# Patient Record
Sex: FEMALE | Race: WHITE | NOT HISPANIC OR LATINO | Employment: OTHER | URBAN - METROPOLITAN AREA
[De-identification: names, ages, dates, MRNs, and addresses within clinical notes are randomized per-mention and may not be internally consistent; named-entity substitution may affect disease eponyms.]

---

## 2022-07-27 ENCOUNTER — TRANSCRIBE ORDERS (OUTPATIENT)
Dept: ADMINISTRATIVE | Facility: HOSPITAL | Age: 67
End: 2022-07-27

## 2022-07-27 ENCOUNTER — HOSPITAL ENCOUNTER (OUTPATIENT)
Dept: MRI IMAGING | Facility: HOSPITAL | Age: 67
Discharge: HOME | End: 2022-07-27

## 2022-07-27 ENCOUNTER — HOSPITAL ENCOUNTER (INPATIENT)
Facility: HOSPITAL | Age: 67
LOS: 3 days | Discharge: HOME | End: 2022-07-30
Attending: INTERNAL MEDICINE | Admitting: INTERNAL MEDICINE

## 2022-07-27 DIAGNOSIS — H05.019 CELLULITIS OF ORBIT, UNSPECIFIED LATERALITY: Primary | ICD-10-CM

## 2022-07-27 DIAGNOSIS — H05.019 CELLULITIS OF ORBIT, UNSPECIFIED LATERALITY: ICD-10-CM

## 2022-07-27 PROBLEM — Q90.9 DOWN SYNDROME: Chronic | Status: ACTIVE | Noted: 2022-07-27

## 2022-07-27 PROBLEM — E03.9 HYPOTHYROID: Chronic | Status: ACTIVE | Noted: 2022-07-27

## 2022-07-27 PROBLEM — R79.89 ELEVATED LFTS: Status: ACTIVE | Noted: 2022-07-27

## 2022-07-27 PROBLEM — R41.89 COGNITIVE IMPAIRMENT: Chronic | Status: ACTIVE | Noted: 2022-07-27

## 2022-07-27 PROBLEM — H05.012 ORBITAL CELLULITIS ON LEFT: Status: ACTIVE | Noted: 2022-07-27

## 2022-07-27 LAB
ALBUMIN SERPL-MCNC: 3.6 G/DL (ref 3.5–5.2)
ALBUMIN/GLOB SERPL: 1.1 G/DL
ALP SERPL-CCNC: 479 U/L (ref 39–117)
ALT SERPL W P-5'-P-CCNC: 96 U/L (ref 1–33)
ANION GAP SERPL CALCULATED.3IONS-SCNC: 10 MMOL/L (ref 5–15)
APTT PPP: 28.2 SECONDS (ref 22.7–35.4)
AST SERPL-CCNC: 48 U/L (ref 1–32)
BASOPHILS # BLD AUTO: 0.06 10*3/MM3 (ref 0–0.2)
BASOPHILS NFR BLD AUTO: 0.8 % (ref 0–1.5)
BILIRUB SERPL-MCNC: 0.2 MG/DL (ref 0–1.2)
BUN SERPL-MCNC: 12 MG/DL (ref 8–23)
BUN/CREAT SERPL: 19.4 (ref 7–25)
CALCIUM SPEC-SCNC: 9.3 MG/DL (ref 8.6–10.5)
CHLORIDE SERPL-SCNC: 100 MMOL/L (ref 98–107)
CK SERPL-CCNC: 62 U/L (ref 20–180)
CO2 SERPL-SCNC: 31 MMOL/L (ref 22–29)
CREAT SERPL-MCNC: 0.62 MG/DL (ref 0.57–1)
CRP SERPL-MCNC: 7.64 MG/DL (ref 0–0.5)
D-LACTATE SERPL-SCNC: 0.7 MMOL/L (ref 0.5–2)
DEPRECATED RDW RBC AUTO: 47.2 FL (ref 37–54)
EGFRCR SERPLBLD CKD-EPI 2021: 99.6 ML/MIN/1.73
EOSINOPHIL # BLD AUTO: 0.1 10*3/MM3 (ref 0–0.4)
EOSINOPHIL NFR BLD AUTO: 1.4 % (ref 0.3–6.2)
ERYTHROCYTE [DISTWIDTH] IN BLOOD BY AUTOMATED COUNT: 13.8 % (ref 12.3–15.4)
ERYTHROCYTE [SEDIMENTATION RATE] IN BLOOD: 90 MM/HR (ref 0–30)
GLOBULIN UR ELPH-MCNC: 3.3 GM/DL
GLUCOSE BLDC GLUCOMTR-MCNC: 84 MG/DL (ref 70–130)
GLUCOSE SERPL-MCNC: 57 MG/DL (ref 65–99)
HBA1C MFR BLD: 5.7 % (ref 4.8–5.6)
HCT VFR BLD AUTO: 42 % (ref 34–46.6)
HGB BLD-MCNC: 14.4 G/DL (ref 12–15.9)
IMM GRANULOCYTES # BLD AUTO: 0.13 10*3/MM3 (ref 0–0.05)
IMM GRANULOCYTES NFR BLD AUTO: 1.8 % (ref 0–0.5)
INR PPP: 1.03 (ref 0.9–1.1)
LYMPHOCYTES # BLD AUTO: 1.5 10*3/MM3 (ref 0.7–3.1)
LYMPHOCYTES NFR BLD AUTO: 20.8 % (ref 19.6–45.3)
MAGNESIUM SERPL-MCNC: 2.6 MG/DL (ref 1.6–2.4)
MCH RBC QN AUTO: 32.3 PG (ref 26.6–33)
MCHC RBC AUTO-ENTMCNC: 34.3 G/DL (ref 31.5–35.7)
MCV RBC AUTO: 94.2 FL (ref 79–97)
MONOCYTES # BLD AUTO: 0.79 10*3/MM3 (ref 0.1–0.9)
MONOCYTES NFR BLD AUTO: 10.9 % (ref 5–12)
NEUTROPHILS NFR BLD AUTO: 4.64 10*3/MM3 (ref 1.7–7)
NEUTROPHILS NFR BLD AUTO: 64.3 % (ref 42.7–76)
NRBC BLD AUTO-RTO: 0 /100 WBC (ref 0–0.2)
PLATELET # BLD AUTO: 368 10*3/MM3 (ref 140–450)
PMV BLD AUTO: 9.6 FL (ref 6–12)
POTASSIUM SERPL-SCNC: 4.4 MMOL/L (ref 3.5–5.2)
PROCALCITONIN SERPL-MCNC: 0.05 NG/ML (ref 0–0.25)
PROT SERPL-MCNC: 6.9 G/DL (ref 6–8.5)
PROTHROMBIN TIME: 13.4 SECONDS (ref 11.7–14.2)
RBC # BLD AUTO: 4.46 10*6/MM3 (ref 3.77–5.28)
SARS-COV-2 RNA RESP QL NAA+PROBE: NOT DETECTED
SODIUM SERPL-SCNC: 141 MMOL/L (ref 136–145)
TSH SERPL DL<=0.05 MIU/L-ACNC: 7.5 UIU/ML (ref 0.27–4.2)
WBC NRBC COR # BLD: 7.22 10*3/MM3 (ref 3.4–10.8)

## 2022-07-27 PROCEDURE — 84443 ASSAY THYROID STIM HORMONE: CPT | Performed by: INTERNAL MEDICINE

## 2022-07-27 PROCEDURE — U0003 INFECTIOUS AGENT DETECTION BY NUCLEIC ACID (DNA OR RNA); SEVERE ACUTE RESPIRATORY SYNDROME CORONAVIRUS 2 (SARS-COV-2) (CORONAVIRUS DISEASE [COVID-19]), AMPLIFIED PROBE TECHNIQUE, MAKING USE OF HIGH THROUGHPUT TECHNOLOGIES AS DESCRIBED BY CMS-2020-01-R: HCPCS | Performed by: INTERNAL MEDICINE

## 2022-07-27 PROCEDURE — 82962 GLUCOSE BLOOD TEST: CPT

## 2022-07-27 PROCEDURE — 25010000002 VANCOMYCIN 10 G RECONSTITUTED SOLUTION: Performed by: INTERNAL MEDICINE

## 2022-07-27 PROCEDURE — 25010000002 PIPERACILLIN SOD-TAZOBACTAM PER 1 G: Performed by: INTERNAL MEDICINE

## 2022-07-27 PROCEDURE — 84145 PROCALCITONIN (PCT): CPT | Performed by: INTERNAL MEDICINE

## 2022-07-27 PROCEDURE — 70543 MRI ORBT/FAC/NCK W/O &W/DYE: CPT

## 2022-07-27 PROCEDURE — 86140 C-REACTIVE PROTEIN: CPT | Performed by: INTERNAL MEDICINE

## 2022-07-27 PROCEDURE — 80053 COMPREHEN METABOLIC PANEL: CPT | Performed by: INTERNAL MEDICINE

## 2022-07-27 PROCEDURE — 85730 THROMBOPLASTIN TIME PARTIAL: CPT | Performed by: INTERNAL MEDICINE

## 2022-07-27 PROCEDURE — 82565 ASSAY OF CREATININE: CPT

## 2022-07-27 PROCEDURE — 83735 ASSAY OF MAGNESIUM: CPT | Performed by: INTERNAL MEDICINE

## 2022-07-27 PROCEDURE — 85025 COMPLETE CBC W/AUTO DIFF WBC: CPT | Performed by: INTERNAL MEDICINE

## 2022-07-27 PROCEDURE — 85652 RBC SED RATE AUTOMATED: CPT | Performed by: INTERNAL MEDICINE

## 2022-07-27 PROCEDURE — 83605 ASSAY OF LACTIC ACID: CPT | Performed by: INTERNAL MEDICINE

## 2022-07-27 PROCEDURE — 83036 HEMOGLOBIN GLYCOSYLATED A1C: CPT | Performed by: INTERNAL MEDICINE

## 2022-07-27 PROCEDURE — 85610 PROTHROMBIN TIME: CPT | Performed by: INTERNAL MEDICINE

## 2022-07-27 PROCEDURE — A9577 INJ MULTIHANCE: HCPCS | Performed by: OPHTHALMOLOGY

## 2022-07-27 PROCEDURE — 82550 ASSAY OF CK (CPK): CPT | Performed by: INTERNAL MEDICINE

## 2022-07-27 PROCEDURE — 0 GADOBENATE DIMEGLUMINE 529 MG/ML SOLUTION: Performed by: OPHTHALMOLOGY

## 2022-07-27 RX ORDER — KETOROLAC TROMETHAMINE 30 MG/ML
30 INJECTION, SOLUTION INTRAMUSCULAR; INTRAVENOUS EVERY 6 HOURS PRN
Status: DISCONTINUED | OUTPATIENT
Start: 2022-07-27 | End: 2022-07-30 | Stop reason: HOSPADM

## 2022-07-27 RX ORDER — ACETAMINOPHEN 160 MG/5ML
650 SOLUTION ORAL EVERY 4 HOURS PRN
Status: DISCONTINUED | OUTPATIENT
Start: 2022-07-27 | End: 2022-07-30 | Stop reason: HOSPADM

## 2022-07-27 RX ORDER — ONDANSETRON 2 MG/ML
4 INJECTION INTRAMUSCULAR; INTRAVENOUS EVERY 6 HOURS PRN
Status: DISCONTINUED | OUTPATIENT
Start: 2022-07-27 | End: 2022-07-30 | Stop reason: HOSPADM

## 2022-07-27 RX ORDER — BISACODYL 5 MG/1
5 TABLET, DELAYED RELEASE ORAL DAILY PRN
Status: DISCONTINUED | OUTPATIENT
Start: 2022-07-27 | End: 2022-07-30 | Stop reason: HOSPADM

## 2022-07-27 RX ORDER — ACETAMINOPHEN 325 MG/1
650 TABLET ORAL EVERY 4 HOURS PRN
Status: DISCONTINUED | OUTPATIENT
Start: 2022-07-27 | End: 2022-07-30 | Stop reason: HOSPADM

## 2022-07-27 RX ORDER — PANTOPRAZOLE SODIUM 40 MG/1
40 TABLET, DELAYED RELEASE ORAL DAILY
COMMUNITY

## 2022-07-27 RX ORDER — UREA 10 %
1 LOTION (ML) TOPICAL NIGHTLY PRN
Status: DISCONTINUED | OUTPATIENT
Start: 2022-07-27 | End: 2022-07-30 | Stop reason: HOSPADM

## 2022-07-27 RX ORDER — LEVOTHYROXINE SODIUM 0.07 MG/1
75 TABLET ORAL
COMMUNITY
End: 2022-07-30 | Stop reason: SDUPTHER

## 2022-07-27 RX ORDER — LEVOTHYROXINE SODIUM 88 UG/1
88 TABLET ORAL
Status: DISCONTINUED | OUTPATIENT
Start: 2022-07-28 | End: 2022-07-30 | Stop reason: HOSPADM

## 2022-07-27 RX ORDER — POLYETHYLENE GLYCOL 3350 17 G/17G
17 POWDER, FOR SOLUTION ORAL DAILY PRN
Status: DISCONTINUED | OUTPATIENT
Start: 2022-07-27 | End: 2022-07-30 | Stop reason: HOSPADM

## 2022-07-27 RX ORDER — CALCIUM CARBONATE 200(500)MG
2 TABLET,CHEWABLE ORAL 2 TIMES DAILY PRN
Status: DISCONTINUED | OUTPATIENT
Start: 2022-07-27 | End: 2022-07-30 | Stop reason: HOSPADM

## 2022-07-27 RX ORDER — SODIUM CHLORIDE 0.9 % (FLUSH) 0.9 %
10 SYRINGE (ML) INJECTION EVERY 12 HOURS SCHEDULED
Status: DISCONTINUED | OUTPATIENT
Start: 2022-07-27 | End: 2022-07-30 | Stop reason: HOSPADM

## 2022-07-27 RX ORDER — ATORVASTATIN CALCIUM 20 MG/1
20 TABLET, FILM COATED ORAL NIGHTLY
Status: DISCONTINUED | OUTPATIENT
Start: 2022-07-27 | End: 2022-07-30 | Stop reason: HOSPADM

## 2022-07-27 RX ORDER — ACETAMINOPHEN 650 MG/1
650 SUPPOSITORY RECTAL EVERY 4 HOURS PRN
Status: DISCONTINUED | OUTPATIENT
Start: 2022-07-27 | End: 2022-07-30 | Stop reason: HOSPADM

## 2022-07-27 RX ORDER — ALENDRONATE SODIUM 70 MG/1
70 TABLET ORAL
COMMUNITY

## 2022-07-27 RX ORDER — GLIMEPIRIDE 2 MG/1
1 TABLET ORAL EVERY 12 HOURS SCHEDULED
Status: DISCONTINUED | OUTPATIENT
Start: 2022-07-27 | End: 2022-07-30 | Stop reason: HOSPADM

## 2022-07-27 RX ORDER — SIMVASTATIN 40 MG
40 TABLET ORAL NIGHTLY
COMMUNITY

## 2022-07-27 RX ORDER — ONDANSETRON 4 MG/1
4 TABLET, FILM COATED ORAL EVERY 6 HOURS PRN
Status: DISCONTINUED | OUTPATIENT
Start: 2022-07-27 | End: 2022-07-30 | Stop reason: HOSPADM

## 2022-07-27 RX ORDER — BISACODYL 10 MG
10 SUPPOSITORY, RECTAL RECTAL DAILY PRN
Status: DISCONTINUED | OUTPATIENT
Start: 2022-07-27 | End: 2022-07-30 | Stop reason: HOSPADM

## 2022-07-27 RX ORDER — AMOXICILLIN 250 MG
2 CAPSULE ORAL 2 TIMES DAILY
Status: DISCONTINUED | OUTPATIENT
Start: 2022-07-27 | End: 2022-07-30 | Stop reason: HOSPADM

## 2022-07-27 RX ORDER — ASCORBIC ACID 500 MG
500 TABLET ORAL DAILY
COMMUNITY

## 2022-07-27 RX ORDER — PANTOPRAZOLE SODIUM 40 MG/1
40 TABLET, DELAYED RELEASE ORAL
Status: DISCONTINUED | OUTPATIENT
Start: 2022-07-28 | End: 2022-07-30 | Stop reason: HOSPADM

## 2022-07-27 RX ORDER — SODIUM CHLORIDE 9 MG/ML
125 INJECTION, SOLUTION INTRAVENOUS CONTINUOUS
Status: DISCONTINUED | OUTPATIENT
Start: 2022-07-27 | End: 2022-07-30

## 2022-07-27 RX ADMIN — Medication 10 ML: at 21:26

## 2022-07-27 RX ADMIN — VANCOMYCIN HYDROCHLORIDE 1250 MG: 10 INJECTION, POWDER, LYOPHILIZED, FOR SOLUTION INTRAVENOUS at 21:25

## 2022-07-27 RX ADMIN — TIMOLOL MALEATE 1 DROP: 2.5 SOLUTION OPHTHALMIC at 23:26

## 2022-07-27 RX ADMIN — SODIUM CHLORIDE 125 ML/HR: 9 INJECTION, SOLUTION INTRAVENOUS at 18:35

## 2022-07-27 RX ADMIN — ATORVASTATIN CALCIUM 20 MG: 20 TABLET, FILM COATED ORAL at 23:26

## 2022-07-27 RX ADMIN — GADOBENATE DIMEGLUMINE 15 ML: 529 INJECTION, SOLUTION INTRAVENOUS at 12:37

## 2022-07-27 RX ADMIN — TAZOBACTAM SODIUM AND PIPERACILLIN SODIUM 3.38 G: 375; 3 INJECTION, SOLUTION INTRAVENOUS at 19:24

## 2022-07-27 NOTE — NURSING NOTE
Dr. Workman read MRI and spoke to Dr. Esau Womack and he is going to admit the patient. Bed board notified and will let us know when a room is assigned. Sister is here with the patient.

## 2022-07-28 ENCOUNTER — APPOINTMENT (OUTPATIENT)
Dept: ULTRASOUND IMAGING | Facility: HOSPITAL | Age: 67
End: 2022-07-28

## 2022-07-28 LAB
ALBUMIN SERPL-MCNC: 2.9 G/DL (ref 3.5–5.2)
ALBUMIN/GLOB SERPL: 1 G/DL
ALP SERPL-CCNC: 397 U/L (ref 39–117)
ALT SERPL W P-5'-P-CCNC: 74 U/L (ref 1–33)
ANION GAP SERPL CALCULATED.3IONS-SCNC: 10.1 MMOL/L (ref 5–15)
AST SERPL-CCNC: 33 U/L (ref 1–32)
BASOPHILS # BLD AUTO: 0.06 10*3/MM3 (ref 0–0.2)
BASOPHILS NFR BLD AUTO: 1 % (ref 0–1.5)
BILIRUB SERPL-MCNC: 0.3 MG/DL (ref 0–1.2)
BUN SERPL-MCNC: 11 MG/DL (ref 8–23)
BUN/CREAT SERPL: 15.5 (ref 7–25)
CALCIUM SPEC-SCNC: 8.2 MG/DL (ref 8.6–10.5)
CHLORIDE SERPL-SCNC: 104 MMOL/L (ref 98–107)
CO2 SERPL-SCNC: 26.9 MMOL/L (ref 22–29)
CREAT BLDA-MCNC: 0.8 MG/DL (ref 0.6–1.3)
CREAT SERPL-MCNC: 0.71 MG/DL (ref 0.57–1)
DEPRECATED RDW RBC AUTO: 48.6 FL (ref 37–54)
EGFRCR SERPLBLD CKD-EPI 2021: 95.1 ML/MIN/1.73
EOSINOPHIL # BLD AUTO: 0.1 10*3/MM3 (ref 0–0.4)
EOSINOPHIL NFR BLD AUTO: 1.6 % (ref 0.3–6.2)
ERYTHROCYTE [DISTWIDTH] IN BLOOD BY AUTOMATED COUNT: 14 % (ref 12.3–15.4)
GLOBULIN UR ELPH-MCNC: 2.9 GM/DL
GLUCOSE SERPL-MCNC: 109 MG/DL (ref 65–99)
HCT VFR BLD AUTO: 40.6 % (ref 34–46.6)
HGB BLD-MCNC: 14 G/DL (ref 12–15.9)
IMM GRANULOCYTES # BLD AUTO: 0.09 10*3/MM3 (ref 0–0.05)
IMM GRANULOCYTES NFR BLD AUTO: 1.5 % (ref 0–0.5)
LYMPHOCYTES # BLD AUTO: 1.12 10*3/MM3 (ref 0.7–3.1)
LYMPHOCYTES NFR BLD AUTO: 18.2 % (ref 19.6–45.3)
MCH RBC QN AUTO: 33.3 PG (ref 26.6–33)
MCHC RBC AUTO-ENTMCNC: 34.5 G/DL (ref 31.5–35.7)
MCV RBC AUTO: 96.7 FL (ref 79–97)
MONOCYTES # BLD AUTO: 0.6 10*3/MM3 (ref 0.1–0.9)
MONOCYTES NFR BLD AUTO: 9.8 % (ref 5–12)
NEUTROPHILS NFR BLD AUTO: 4.17 10*3/MM3 (ref 1.7–7)
NEUTROPHILS NFR BLD AUTO: 67.9 % (ref 42.7–76)
NRBC BLD AUTO-RTO: 0 /100 WBC (ref 0–0.2)
PLATELET # BLD AUTO: 301 10*3/MM3 (ref 140–450)
PMV BLD AUTO: 9.5 FL (ref 6–12)
POTASSIUM SERPL-SCNC: 4 MMOL/L (ref 3.5–5.2)
PROT SERPL-MCNC: 5.8 G/DL (ref 6–8.5)
RBC # BLD AUTO: 4.2 10*6/MM3 (ref 3.77–5.28)
SODIUM SERPL-SCNC: 141 MMOL/L (ref 136–145)
T3FREE SERPL-MCNC: 1.56 PG/ML (ref 2–4.4)
T4 FREE SERPL-MCNC: 1.04 NG/DL (ref 0.93–1.7)
WBC NRBC COR # BLD: 6.14 10*3/MM3 (ref 3.4–10.8)

## 2022-07-28 PROCEDURE — 85025 COMPLETE CBC W/AUTO DIFF WBC: CPT | Performed by: INTERNAL MEDICINE

## 2022-07-28 PROCEDURE — 76705 ECHO EXAM OF ABDOMEN: CPT

## 2022-07-28 PROCEDURE — 84481 FREE ASSAY (FT-3): CPT | Performed by: STUDENT IN AN ORGANIZED HEALTH CARE EDUCATION/TRAINING PROGRAM

## 2022-07-28 PROCEDURE — 25010000002 PIPERACILLIN SOD-TAZOBACTAM PER 1 G: Performed by: INTERNAL MEDICINE

## 2022-07-28 PROCEDURE — 80053 COMPREHEN METABOLIC PANEL: CPT | Performed by: INTERNAL MEDICINE

## 2022-07-28 PROCEDURE — 25010000002 METHYLPREDNISOLONE PER 125 MG: Performed by: OPHTHALMOLOGY

## 2022-07-28 PROCEDURE — 25010000002 VANCOMYCIN 750 MG RECONSTITUTED SOLUTION: Performed by: INTERNAL MEDICINE

## 2022-07-28 PROCEDURE — 25010000002 CEFTRIAXONE PER 250 MG: Performed by: INTERNAL MEDICINE

## 2022-07-28 PROCEDURE — 99223 1ST HOSP IP/OBS HIGH 75: CPT | Performed by: INTERNAL MEDICINE

## 2022-07-28 PROCEDURE — 84439 ASSAY OF FREE THYROXINE: CPT | Performed by: STUDENT IN AN ORGANIZED HEALTH CARE EDUCATION/TRAINING PROGRAM

## 2022-07-28 RX ORDER — METHYLPREDNISOLONE SODIUM SUCCINATE 125 MG/2ML
60 INJECTION, POWDER, LYOPHILIZED, FOR SOLUTION INTRAMUSCULAR; INTRAVENOUS DAILY
Status: DISCONTINUED | OUTPATIENT
Start: 2022-07-28 | End: 2022-07-30

## 2022-07-28 RX ORDER — ERYTHROMYCIN 5 MG/G
OINTMENT OPHTHALMIC EVERY 12 HOURS
Status: DISCONTINUED | OUTPATIENT
Start: 2022-07-28 | End: 2022-07-30 | Stop reason: HOSPADM

## 2022-07-28 RX ADMIN — SODIUM CHLORIDE 750 MG: 900 INJECTION, SOLUTION INTRAVENOUS at 10:58

## 2022-07-28 RX ADMIN — ERYTHROMYCIN: 5 OINTMENT OPHTHALMIC at 22:58

## 2022-07-28 RX ADMIN — SODIUM CHLORIDE 125 ML/HR: 9 INJECTION, SOLUTION INTRAVENOUS at 14:03

## 2022-07-28 RX ADMIN — CEFTRIAXONE 2 G: 2 INJECTION, POWDER, FOR SOLUTION INTRAMUSCULAR; INTRAVENOUS at 22:57

## 2022-07-28 RX ADMIN — PANTOPRAZOLE SODIUM 40 MG: 40 TABLET, DELAYED RELEASE ORAL at 07:02

## 2022-07-28 RX ADMIN — LEVOTHYROXINE SODIUM 88 MCG: 0.09 TABLET ORAL at 07:02

## 2022-07-28 RX ADMIN — TIMOLOL MALEATE 1 DROP: 2.5 SOLUTION OPHTHALMIC at 10:59

## 2022-07-28 RX ADMIN — Medication 10 ML: at 22:58

## 2022-07-28 RX ADMIN — ATORVASTATIN CALCIUM 20 MG: 20 TABLET, FILM COATED ORAL at 22:56

## 2022-07-28 RX ADMIN — DOCUSATE SODIUM 50MG AND SENNOSIDES 8.6MG 2 TABLET: 8.6; 5 TABLET, FILM COATED ORAL at 10:58

## 2022-07-28 RX ADMIN — TAZOBACTAM SODIUM AND PIPERACILLIN SODIUM 3.38 G: 375; 3 INJECTION, SOLUTION INTRAVENOUS at 14:13

## 2022-07-28 RX ADMIN — TAZOBACTAM SODIUM AND PIPERACILLIN SODIUM 3.38 G: 375; 3 INJECTION, SOLUTION INTRAVENOUS at 03:08

## 2022-07-28 RX ADMIN — TIMOLOL MALEATE 1 DROP: 2.5 SOLUTION OPHTHALMIC at 22:58

## 2022-07-28 RX ADMIN — METHYLPREDNISOLONE SODIUM SUCCINATE 60 MG: 125 INJECTION, POWDER, FOR SOLUTION INTRAMUSCULAR; INTRAVENOUS at 18:01

## 2022-07-29 LAB
ALBUMIN SERPL-MCNC: 2.8 G/DL (ref 3.5–5.2)
ALBUMIN SERPL-MCNC: 3 G/DL (ref 3.5–5.2)
ALBUMIN/GLOB SERPL: 0.9 G/DL
ALP SERPL-CCNC: 360 U/L (ref 39–117)
ALP SERPL-CCNC: 364 U/L (ref 39–117)
ALT SERPL W P-5'-P-CCNC: 66 U/L (ref 1–33)
ALT SERPL W P-5'-P-CCNC: 66 U/L (ref 1–33)
ANION GAP SERPL CALCULATED.3IONS-SCNC: 10.2 MMOL/L (ref 5–15)
AST SERPL-CCNC: 29 U/L (ref 1–32)
AST SERPL-CCNC: 36 U/L (ref 1–32)
BASOPHILS # BLD AUTO: 0.02 10*3/MM3 (ref 0–0.2)
BASOPHILS NFR BLD AUTO: 0.2 % (ref 0–1.5)
BILIRUB CONJ SERPL-MCNC: <0.2 MG/DL (ref 0–0.3)
BILIRUB CONJ SERPL-MCNC: <0.2 MG/DL (ref 0–0.3)
BILIRUB INDIRECT SERPL-MCNC: ABNORMAL MG/DL
BILIRUB SERPL-MCNC: <0.2 MG/DL (ref 0–1.2)
BILIRUB SERPL-MCNC: <0.2 MG/DL (ref 0–1.2)
BUN SERPL-MCNC: 11 MG/DL (ref 8–23)
BUN/CREAT SERPL: 19.3 (ref 7–25)
CALCIUM SPEC-SCNC: 8.2 MG/DL (ref 8.6–10.5)
CHLORIDE SERPL-SCNC: 108 MMOL/L (ref 98–107)
CO2 SERPL-SCNC: 23.8 MMOL/L (ref 22–29)
CREAT SERPL-MCNC: 0.57 MG/DL (ref 0.57–1)
DEPRECATED RDW RBC AUTO: 48.3 FL (ref 37–54)
EGFRCR SERPLBLD CKD-EPI 2021: 101.6 ML/MIN/1.73
EOSINOPHIL # BLD AUTO: 0 10*3/MM3 (ref 0–0.4)
EOSINOPHIL NFR BLD AUTO: 0 % (ref 0.3–6.2)
ERYTHROCYTE [DISTWIDTH] IN BLOOD BY AUTOMATED COUNT: 13.9 % (ref 12.3–15.4)
GLOBULIN UR ELPH-MCNC: 3.1 GM/DL
GLUCOSE SERPL-MCNC: 124 MG/DL (ref 65–99)
HCT VFR BLD AUTO: 39.6 % (ref 34–46.6)
HGB BLD-MCNC: 13.6 G/DL (ref 12–15.9)
IMM GRANULOCYTES # BLD AUTO: 0.08 10*3/MM3 (ref 0–0.05)
IMM GRANULOCYTES NFR BLD AUTO: 1 % (ref 0–0.5)
LYMPHOCYTES # BLD AUTO: 0.55 10*3/MM3 (ref 0.7–3.1)
LYMPHOCYTES NFR BLD AUTO: 6.8 % (ref 19.6–45.3)
MAGNESIUM SERPL-MCNC: 2.2 MG/DL (ref 1.6–2.4)
MCH RBC QN AUTO: 33 PG (ref 26.6–33)
MCHC RBC AUTO-ENTMCNC: 34.3 G/DL (ref 31.5–35.7)
MCV RBC AUTO: 96.1 FL (ref 79–97)
MONOCYTES # BLD AUTO: 0.18 10*3/MM3 (ref 0.1–0.9)
MONOCYTES NFR BLD AUTO: 2.2 % (ref 5–12)
NEUTROPHILS NFR BLD AUTO: 7.24 10*3/MM3 (ref 1.7–7)
NEUTROPHILS NFR BLD AUTO: 89.8 % (ref 42.7–76)
NRBC BLD AUTO-RTO: 0 /100 WBC (ref 0–0.2)
PHOSPHATE SERPL-MCNC: 3.5 MG/DL (ref 2.5–4.5)
PLATELET # BLD AUTO: 326 10*3/MM3 (ref 140–450)
PMV BLD AUTO: 9.7 FL (ref 6–12)
POTASSIUM SERPL-SCNC: 4.3 MMOL/L (ref 3.5–5.2)
PROT SERPL-MCNC: 5.9 G/DL (ref 6–8.5)
PROT SERPL-MCNC: 6.4 G/DL (ref 6–8.5)
RBC # BLD AUTO: 4.12 10*6/MM3 (ref 3.77–5.28)
SODIUM SERPL-SCNC: 142 MMOL/L (ref 136–145)
VANCOMYCIN TROUGH SERPL-MCNC: 10.6 MCG/ML (ref 5–20)
WBC NRBC COR # BLD: 8.07 10*3/MM3 (ref 3.4–10.8)

## 2022-07-29 PROCEDURE — 80053 COMPREHEN METABOLIC PANEL: CPT | Performed by: STUDENT IN AN ORGANIZED HEALTH CARE EDUCATION/TRAINING PROGRAM

## 2022-07-29 PROCEDURE — 83735 ASSAY OF MAGNESIUM: CPT | Performed by: STUDENT IN AN ORGANIZED HEALTH CARE EDUCATION/TRAINING PROGRAM

## 2022-07-29 PROCEDURE — 82248 BILIRUBIN DIRECT: CPT | Performed by: STUDENT IN AN ORGANIZED HEALTH CARE EDUCATION/TRAINING PROGRAM

## 2022-07-29 PROCEDURE — 80202 ASSAY OF VANCOMYCIN: CPT | Performed by: INTERNAL MEDICINE

## 2022-07-29 PROCEDURE — 84100 ASSAY OF PHOSPHORUS: CPT | Performed by: STUDENT IN AN ORGANIZED HEALTH CARE EDUCATION/TRAINING PROGRAM

## 2022-07-29 PROCEDURE — 85025 COMPLETE CBC W/AUTO DIFF WBC: CPT | Performed by: STUDENT IN AN ORGANIZED HEALTH CARE EDUCATION/TRAINING PROGRAM

## 2022-07-29 PROCEDURE — 25010000002 VANCOMYCIN PER 500 MG: Performed by: INTERNAL MEDICINE

## 2022-07-29 PROCEDURE — 99232 SBSQ HOSP IP/OBS MODERATE 35: CPT | Performed by: INTERNAL MEDICINE

## 2022-07-29 PROCEDURE — 25010000002 VANCOMYCIN 750 MG RECONSTITUTED SOLUTION: Performed by: INTERNAL MEDICINE

## 2022-07-29 PROCEDURE — 25010000002 CEFTRIAXONE PER 250 MG: Performed by: INTERNAL MEDICINE

## 2022-07-29 PROCEDURE — 25010000002 METHYLPREDNISOLONE PER 125 MG: Performed by: OPHTHALMOLOGY

## 2022-07-29 RX ORDER — VANCOMYCIN HYDROCHLORIDE 1 G/200ML
1000 INJECTION, SOLUTION INTRAVENOUS EVERY 12 HOURS
Status: DISCONTINUED | OUTPATIENT
Start: 2022-07-29 | End: 2022-07-29

## 2022-07-29 RX ORDER — VANCOMYCIN HYDROCHLORIDE 1 G/200ML
1000 INJECTION, SOLUTION INTRAVENOUS EVERY 12 HOURS
Status: DISCONTINUED | OUTPATIENT
Start: 2022-07-29 | End: 2022-07-30

## 2022-07-29 RX ADMIN — DOCUSATE SODIUM 50MG AND SENNOSIDES 8.6MG 2 TABLET: 8.6; 5 TABLET, FILM COATED ORAL at 08:15

## 2022-07-29 RX ADMIN — TIMOLOL MALEATE 1 DROP: 2.5 SOLUTION OPHTHALMIC at 09:09

## 2022-07-29 RX ADMIN — SODIUM CHLORIDE 125 ML/HR: 9 INJECTION, SOLUTION INTRAVENOUS at 20:31

## 2022-07-29 RX ADMIN — SODIUM CHLORIDE 750 MG: 900 INJECTION, SOLUTION INTRAVENOUS at 00:30

## 2022-07-29 RX ADMIN — VANCOMYCIN HYDROCHLORIDE 1000 MG: 1 INJECTION, SOLUTION INTRAVENOUS at 12:41

## 2022-07-29 RX ADMIN — TIMOLOL MALEATE 1 DROP: 2.5 SOLUTION OPHTHALMIC at 20:17

## 2022-07-29 RX ADMIN — ERYTHROMYCIN: 5 OINTMENT OPHTHALMIC at 09:09

## 2022-07-29 RX ADMIN — LEVOTHYROXINE SODIUM 88 MCG: 0.09 TABLET ORAL at 06:35

## 2022-07-29 RX ADMIN — SODIUM CHLORIDE 125 ML/HR: 9 INJECTION, SOLUTION INTRAVENOUS at 14:56

## 2022-07-29 RX ADMIN — CEFTRIAXONE 2 G: 2 INJECTION, POWDER, FOR SOLUTION INTRAMUSCULAR; INTRAVENOUS at 20:14

## 2022-07-29 RX ADMIN — PANTOPRAZOLE SODIUM 40 MG: 40 TABLET, DELAYED RELEASE ORAL at 06:35

## 2022-07-29 RX ADMIN — SODIUM CHLORIDE 125 ML/HR: 9 INJECTION, SOLUTION INTRAVENOUS at 02:09

## 2022-07-29 RX ADMIN — ERYTHROMYCIN: 5 OINTMENT OPHTHALMIC at 20:16

## 2022-07-29 RX ADMIN — METHYLPREDNISOLONE SODIUM SUCCINATE 60 MG: 125 INJECTION, POWDER, FOR SOLUTION INTRAMUSCULAR; INTRAVENOUS at 08:12

## 2022-07-29 RX ADMIN — ATORVASTATIN CALCIUM 20 MG: 20 TABLET, FILM COATED ORAL at 20:15

## 2022-07-30 VITALS
OXYGEN SATURATION: 98 % | HEART RATE: 71 BPM | RESPIRATION RATE: 18 BRPM | SYSTOLIC BLOOD PRESSURE: 125 MMHG | WEIGHT: 134.7 LBS | DIASTOLIC BLOOD PRESSURE: 70 MMHG | BODY MASS INDEX: 28.28 KG/M2 | TEMPERATURE: 97.1 F | HEIGHT: 58 IN

## 2022-07-30 LAB
ALBUMIN SERPL-MCNC: 3.4 G/DL (ref 3.5–5.2)
ALBUMIN/GLOB SERPL: 1 G/DL
ALP SERPL-CCNC: 341 U/L (ref 39–117)
ALT SERPL W P-5'-P-CCNC: 61 U/L (ref 1–33)
ANION GAP SERPL CALCULATED.3IONS-SCNC: 9 MMOL/L (ref 5–15)
AST SERPL-CCNC: 25 U/L (ref 1–32)
BILIRUB SERPL-MCNC: 0.2 MG/DL (ref 0–1.2)
BUN SERPL-MCNC: 13 MG/DL (ref 8–23)
BUN/CREAT SERPL: 21.3 (ref 7–25)
CALCIUM SPEC-SCNC: 9 MG/DL (ref 8.6–10.5)
CHLORIDE SERPL-SCNC: 105 MMOL/L (ref 98–107)
CO2 SERPL-SCNC: 30 MMOL/L (ref 22–29)
CREAT SERPL-MCNC: 0.61 MG/DL (ref 0.57–1)
DEPRECATED RDW RBC AUTO: 48.4 FL (ref 37–54)
EGFRCR SERPLBLD CKD-EPI 2021: 100 ML/MIN/1.73
ERYTHROCYTE [DISTWIDTH] IN BLOOD BY AUTOMATED COUNT: 14 % (ref 12.3–15.4)
GLOBULIN UR ELPH-MCNC: 3.3 GM/DL
GLUCOSE SERPL-MCNC: 64 MG/DL (ref 65–99)
HAV IGM SERPL QL IA: NORMAL
HBV CORE IGM SERPL QL IA: NORMAL
HBV SURFACE AG SERPL QL IA: NORMAL
HCT VFR BLD AUTO: 39.9 % (ref 34–46.6)
HCV AB SER DONR QL: NORMAL
HGB BLD-MCNC: 13.9 G/DL (ref 12–15.9)
MAGNESIUM SERPL-MCNC: 2.2 MG/DL (ref 1.6–2.4)
MCH RBC QN AUTO: 33.2 PG (ref 26.6–33)
MCHC RBC AUTO-ENTMCNC: 34.8 G/DL (ref 31.5–35.7)
MCV RBC AUTO: 95.2 FL (ref 79–97)
PHOSPHATE SERPL-MCNC: 3.3 MG/DL (ref 2.5–4.5)
PLATELET # BLD AUTO: 365 10*3/MM3 (ref 140–450)
PMV BLD AUTO: 9.8 FL (ref 6–12)
POTASSIUM SERPL-SCNC: 4.6 MMOL/L (ref 3.5–5.2)
PROT SERPL-MCNC: 6.7 G/DL (ref 6–8.5)
RBC # BLD AUTO: 4.19 10*6/MM3 (ref 3.77–5.28)
SODIUM SERPL-SCNC: 144 MMOL/L (ref 136–145)
WBC NRBC COR # BLD: 12.2 10*3/MM3 (ref 3.4–10.8)

## 2022-07-30 PROCEDURE — 85027 COMPLETE CBC AUTOMATED: CPT | Performed by: INTERNAL MEDICINE

## 2022-07-30 PROCEDURE — 83735 ASSAY OF MAGNESIUM: CPT | Performed by: STUDENT IN AN ORGANIZED HEALTH CARE EDUCATION/TRAINING PROGRAM

## 2022-07-30 PROCEDURE — 63710000001 PREDNISONE PER 1 MG: Performed by: STUDENT IN AN ORGANIZED HEALTH CARE EDUCATION/TRAINING PROGRAM

## 2022-07-30 PROCEDURE — 80074 ACUTE HEPATITIS PANEL: CPT | Performed by: STUDENT IN AN ORGANIZED HEALTH CARE EDUCATION/TRAINING PROGRAM

## 2022-07-30 PROCEDURE — 80053 COMPREHEN METABOLIC PANEL: CPT | Performed by: STUDENT IN AN ORGANIZED HEALTH CARE EDUCATION/TRAINING PROGRAM

## 2022-07-30 PROCEDURE — 84100 ASSAY OF PHOSPHORUS: CPT | Performed by: STUDENT IN AN ORGANIZED HEALTH CARE EDUCATION/TRAINING PROGRAM

## 2022-07-30 PROCEDURE — 25010000002 VANCOMYCIN PER 500 MG: Performed by: INTERNAL MEDICINE

## 2022-07-30 PROCEDURE — 99232 SBSQ HOSP IP/OBS MODERATE 35: CPT | Performed by: INTERNAL MEDICINE

## 2022-07-30 RX ORDER — GLIMEPIRIDE 2 MG/1
1 TABLET ORAL EVERY 12 HOURS SCHEDULED
Qty: 5 ML | Refills: 0 | Status: SHIPPED | OUTPATIENT
Start: 2022-07-30 | End: 2022-09-18

## 2022-07-30 RX ORDER — LEVOTHYROXINE SODIUM 88 UG/1
88 TABLET ORAL
Qty: 30 TABLET | Refills: 0 | Status: SHIPPED | OUTPATIENT
Start: 2022-07-30 | End: 2022-08-29

## 2022-07-30 RX ORDER — AMOXICILLIN 250 MG/1
500 CAPSULE ORAL EVERY 8 HOURS SCHEDULED
Status: DISCONTINUED | OUTPATIENT
Start: 2022-07-30 | End: 2022-07-30 | Stop reason: HOSPADM

## 2022-07-30 RX ORDER — ACETAMINOPHEN 325 MG/1
650 TABLET ORAL EVERY 6 HOURS PRN
Start: 2022-07-30

## 2022-07-30 RX ORDER — PREDNISONE 20 MG/1
TABLET ORAL
Qty: 13 TABLET | Refills: 0 | Status: SHIPPED | OUTPATIENT
Start: 2022-07-31 | End: 2022-08-10

## 2022-07-30 RX ORDER — PREDNISONE 20 MG/1
40 TABLET ORAL ONCE
Status: COMPLETED | OUTPATIENT
Start: 2022-07-30 | End: 2022-07-30

## 2022-07-30 RX ORDER — SULFAMETHOXAZOLE AND TRIMETHOPRIM 800; 160 MG/1; MG/1
1 TABLET ORAL EVERY 12 HOURS SCHEDULED
Qty: 22 TABLET | Refills: 0 | Status: SHIPPED | OUTPATIENT
Start: 2022-07-30 | End: 2022-08-10

## 2022-07-30 RX ORDER — AMOXICILLIN 500 MG/1
500 CAPSULE ORAL EVERY 8 HOURS SCHEDULED
Qty: 33 CAPSULE | Refills: 0 | Status: SHIPPED | OUTPATIENT
Start: 2022-07-30 | End: 2022-08-10

## 2022-07-30 RX ORDER — SULFAMETHOXAZOLE AND TRIMETHOPRIM 800; 160 MG/1; MG/1
1 TABLET ORAL EVERY 12 HOURS SCHEDULED
Status: DISCONTINUED | OUTPATIENT
Start: 2022-07-30 | End: 2022-07-30 | Stop reason: HOSPADM

## 2022-07-30 RX ADMIN — VANCOMYCIN HYDROCHLORIDE 1000 MG: 1 INJECTION, SOLUTION INTRAVENOUS at 01:12

## 2022-07-30 RX ADMIN — LEVOTHYROXINE SODIUM 88 MCG: 0.09 TABLET ORAL at 05:10

## 2022-07-30 RX ADMIN — ERYTHROMYCIN: 5 OINTMENT OPHTHALMIC at 07:59

## 2022-07-30 RX ADMIN — PANTOPRAZOLE SODIUM 40 MG: 40 TABLET, DELAYED RELEASE ORAL at 05:10

## 2022-07-30 RX ADMIN — SULFAMETHOXAZOLE AND TRIMETHOPRIM 1 TABLET: 800; 160 TABLET ORAL at 11:32

## 2022-07-30 RX ADMIN — TIMOLOL MALEATE 1 DROP: 2.5 SOLUTION OPHTHALMIC at 07:59

## 2022-07-30 RX ADMIN — PREDNISONE 40 MG: 20 TABLET ORAL at 10:24

## 2022-08-02 NOTE — CASE MANAGEMENT/SOCIAL WORK
Case Management Discharge Note      Final Note: Home    Provided Post Acute Provider List?: N/A  N/A Provider List Comment: Denies needs. Plan is home    Selected Continued Care - Discharged on 7/30/2022 Admission date: 7/27/2022 - Discharge disposition: Home or Self Care    Destination    No services have been selected for the patient.              Durable Medical Equipment    No services have been selected for the patient.              Dialysis/Infusion    No services have been selected for the patient.              Home Medical Care    No services have been selected for the patient.              Therapy    No services have been selected for the patient.              Community Resources    No services have been selected for the patient.              Community & DME    No services have been selected for the patient.                       Final Discharge Disposition Code: 01 - home or self-care

## 2022-08-04 NOTE — PAYOR COMM NOTE
"3RD TIME FAXING CLINICAL TO THIS SAME FAX#[phone] PLEASE PLEASE CHECK YOUR FAX MACHINE!    Annette Vitale (66 y.o. Female)             Date of Birth   1955    Social Security Number       Address   [address] [place] [address]    Home Phone   [phone]    MRN   [id number]       Restorationism   None    Marital Status   Single                            Admission Date   7/27/22    Admission Type   Urgent    Admitting Provider   Kimberly Carmona MD    Attending Provider       Department, Room/Bed   [address], [facility]/1       Discharge Date   7/30/2022    Discharge Disposition   Home or Self Care    Discharge Destination                               Attending Provider: (none)   Allergies: No Known Allergies    Isolation: None   Infection: None   Code Status: Prior   Advance Care Planning Activity    Ht: 147.3 cm (58\")   Wt: 61.1 kg (134 lb 11.2 oz)    Admission Cmt: None   Principal Problem: None                Active Insurance as of 7/27/2022     Primary Coverage     Payor Plan Insurance Group Employer/Plan Group    ANTHEM MEDICARE REPLACEMENT ANTHEM MEDICARE ADVANTAGE [id number]     Payor Plan Address Payor Plan Phone Number Payor Plan Fax Number Effective Dates    [address] [phone]  1/1/2022 - None Entered    [facility] [id number]       Subscriber Name Subscriber Birth Date Member ID       ANNETTE VITALE 1955 [id number]                 Emergency Contacts      (Rel.) Home Phone Work Phone Mobile Phone    STACIE WINCHESTER (Sister) [phone] -- [phone]               History & Physical      Kimberly Carmona MD at 07/27/22 2112          PCP: Provider, No Known    Chief complaint left thigh swollen and red    HPI  Patient is a 66 y.o. female presents with history of cerebral palsy who presents [facility] due to increasing edema and redness on left eye.  Patient sister is at bedside and can give the " history and said 3 days ago she started having some redness in her right eye, 2 days ago she had redness in both eyes and she tried some refresh drops, yesterday at 8 AM she noticed that the left eye was significantly swollen and at 8 AM she called primary care doctor which she got into which then got referred to the eye doctor which then got referred to an eye specialist and then an MRI was ordered and they went to [employer] daughters however sister reports that the radiology did not want to stay to get it done and rescheduled it for today at [facility].  Sister is frustrated since she got here and they did not have the order.  MRI was performed and results called to Dr. Womack and he requested patient be admitted.  Patient was directly admitted from radiology to start IV antibiotics.  Sister states that it was only 1/5 the swollen yesterday and has progressed quickly.  Is difficult to get a history of the patient and is sometimes unreliable.  She says she can see out of it okay but it is just a little watery.  There is observable difficulty with the mobility of the eye.  Patient denies any light sensitivity,  No fevers or chills, no constipation diarrhea or nausea vomiting      PAST MEDICAL HISTORY  Past Medical History:   Diagnosis Date   • Cognitive impairment 7/27/2022   • Down syndrome 7/27/2022       PAST SURGICAL HISTORY  No past surgical history on file.    FAMILY HISTORY  No family history on file.    SOCIAL HISTORY       MEDICATIONS:  Medications Prior to Admission   Medication Sig Dispense Refill Last Dose   • alendronate (FOSAMAX) 70 MG tablet Take 70 mg by mouth Every 7 (Seven) Days. Patient takes every Monday morning   7/25/2022   • ascorbic acid (VITAMIN C) 500 MG tablet Take 500 mg by mouth Daily. Takes at noon   7/27/2022 at Unknown time   • levothyroxine (SYNTHROID, LEVOTHROID) 75 MCG tablet Take 75 mcg by mouth Every Morning.   7/27/2022 at Unknown time   • pantoprazole (PROTONIX) 40 MG EC  "tablet Take 40 mg by mouth Daily. Takes at noon   7/27/2022 at Unknown time   • simvastatin (ZOCOR) 40 MG tablet Take 40 mg by mouth Every Night.   7/26/2022 at Unknown time       Allergies:  Patient has no known allergies.    Review of Systems:  Unable to obtain due to cognitive delay        Vital Signs  Temp:  [97 °F (36.1 °C)] 97 °F (36.1 °C)  Heart Rate:  [60] 60  Resp:  [16] 16  BP: (116)/(73) 116/73  Flowsheet Rows    Flowsheet Row First Filed Value   Admission Height 147.3 cm (58\") Documented at 07/27/2022 1630   Admission Weight 61.1 kg (134 lb 11.2 oz) Documented at 07/27/2022 1630         Body mass index is 28.15 kg/m².    Physical Exam:  General Appearance:    Alert, cooperative, in no acute distress; Down syndrome   Head:    Normocephalic, without obvious abnormality, atraumatic   Eyes:         conjunctivae edema and sclerae erythematous mildly on right and severely on left, no icterus, PERRLA, decrease EOM, exophthalmos on left, erythema periorbitally on left, edematous left lid   ENT:    Ears grossly intact, oral mucosa moist, poor dentition   Neck:   No adenopathy, supple, trachea midline,    Back:     Normal to inspection, range of motion normal   Lungs:     Clear to auscultation,respirations regular, even and                   unlabored    Heart:    Regular rhythm and normal rate,  no murmur, normal S1 and S2,   Abdomen:     Normal bowel sounds, no masses,  soft non-tender, non-distended,    Extremities:   Moves all extremities well, no cyanosis, no edema,   + clubbing digits            Pulses:   Pulses palpable and equal bilaterally   Skin:   No bleeding, rash, bruising    Neurologic:    Psych:   Cranial nerves 2 - 12 grossly intact, sensation grossly intact,     Moves all extremities well, equal bilateral strength    Alert and Oriented x 2, Normal Affect     I used full protective equipment while examining this patient.  This includes N95 face mask /protective eyewear and protective gown when " appropriate.  I washed/sanitized my hands before entering the room and immediately upon leaving the room.    LABS:  Admission on 07/27/2022   Component Date Value Ref Range Status   • COVID19 07/27/2022 Not Detected  Not Detected - Ref. Range Final   • WBC 07/27/2022 7.22  3.40 - 10.80 10*3/mm3 Final   • RBC 07/27/2022 4.46  3.77 - 5.28 10*6/mm3 Final   • Hemoglobin 07/27/2022 14.4  12.0 - 15.9 g/dL Final   • Hematocrit 07/27/2022 42.0  34.0 - 46.6 % Final   • MCV 07/27/2022 94.2  79.0 - 97.0 fL Final   • MCH 07/27/2022 32.3  26.6 - 33.0 pg Final   • MCHC 07/27/2022 34.3  31.5 - 35.7 g/dL Final   • RDW 07/27/2022 13.8  12.3 - 15.4 % Final   • RDW-SD 07/27/2022 47.2  37.0 - 54.0 fl Final   • MPV 07/27/2022 9.6  6.0 - 12.0 fL Final   • Platelets 07/27/2022 368  140 - 450 10*3/mm3 Final   • Neutrophil % 07/27/2022 64.3  42.7 - 76.0 % Final   • Lymphocyte % 07/27/2022 20.8  19.6 - 45.3 % Final   • Monocyte % 07/27/2022 10.9  5.0 - 12.0 % Final   • Eosinophil % 07/27/2022 1.4  0.3 - 6.2 % Final   • Basophil % 07/27/2022 0.8  0.0 - 1.5 % Final   • Immature Grans % 07/27/2022 1.8 (A) 0.0 - 0.5 % Final   • Neutrophils, Absolute 07/27/2022 4.64  1.70 - 7.00 10*3/mm3 Final   • Lymphocytes, Absolute 07/27/2022 1.50  0.70 - 3.10 10*3/mm3 Final   • Monocytes, Absolute 07/27/2022 0.79  0.10 - 0.90 10*3/mm3 Final   • Eosinophils, Absolute 07/27/2022 0.10  0.00 - 0.40 10*3/mm3 Final   • Basophils, Absolute 07/27/2022 0.06  0.00 - 0.20 10*3/mm3 Final   • Immature Grans, Absolute 07/27/2022 0.13 (A) 0.00 - 0.05 10*3/mm3 Final   • nRBC 07/27/2022 0.0  0.0 - 0.2 /100 WBC Final   • PTT 07/27/2022 28.2  22.7 - 35.4 seconds Final   • Creatine Kinase 07/27/2022 62  20 - 180 U/L Final   • Glucose 07/27/2022 57 (A) 65 - 99 mg/dL Final   • BUN 07/27/2022 12  8 - 23 mg/dL Final   • Creatinine 07/27/2022 0.62  0.57 - 1.00 mg/dL Final   • Sodium 07/27/2022 141  136 - 145 mmol/L Final   • Potassium 07/27/2022 4.4  3.5 - 5.2 mmol/L Final   •  Chloride 07/27/2022 100  98 - 107 mmol/L Final   • CO2 07/27/2022 31.0 (A) 22.0 - 29.0 mmol/L Final   • Calcium 07/27/2022 9.3  8.6 - 10.5 mg/dL Final   • Total Protein 07/27/2022 6.9  6.0 - 8.5 g/dL Final   • Albumin 07/27/2022 3.60  3.50 - 5.20 g/dL Final   • ALT (SGPT) 07/27/2022 96 (A) 1 - 33 U/L Final   • AST (SGOT) 07/27/2022 48 (A) 1 - 32 U/L Final   • Alkaline Phosphatase 07/27/2022 479 (A) 39 - 117 U/L Final   • Total Bilirubin 07/27/2022 0.2  0.0 - 1.2 mg/dL Final   • Globulin 07/27/2022 3.3  gm/dL Final   • A/G Ratio 07/27/2022 1.1  g/dL Final   • BUN/Creatinine Ratio 07/27/2022 19.4  7.0 - 25.0 Final   • Anion Gap 07/27/2022 10.0  5.0 - 15.0 mmol/L Final   • eGFR 07/27/2022 99.6  >60.0 mL/min/1.73 Final    National Kidney Foundation and American Society of Nephrology (ASN) Task Force recommended calculation based on the Chronic Kidney Disease Epidemiology Collaboration (CKD-EPI) equation refit without adjustment for race.   • Hemoglobin A1C 07/27/2022 5.70 (A) 4.80 - 5.60 % Final   • Lactate 07/27/2022 0.7  0.5 - 2.0 mmol/L Final   • Magnesium 07/27/2022 2.6 (A) 1.6 - 2.4 mg/dL Final   • Procalcitonin 07/27/2022 0.05  0.00 - 0.25 ng/mL Final   • Protime 07/27/2022 13.4  11.7 - 14.2 Seconds Final   • INR 07/27/2022 1.03  0.90 - 1.10 Final   • TSH 07/27/2022 7.500 (A) 0.270 - 4.200 uIU/mL Final   • C-Reactive Protein 07/27/2022 7.64 (A) 0.00 - 0.50 mg/dL Final   • Sed Rate 07/27/2022 90 (A) 0 - 30 mm/hr Final   Hospital Outpatient Visit on 07/27/2022   Component Date Value Ref Range Status   • Glucose 07/27/2022 84  70 - 130 mg/dL Final    Meter: [id number] : [id number] Thom BA       DIAGNOSTICS:  MRI Orbit Face Neck With & Without Contrast    Result Date: 7/27/2022  1. The extraocular muscles including the superior, medial, inferior and lateral rectus muscles appear somewhat thickened bilaterally, may be upper limits of normal, could be mildly thickened. Please correlate clinically as to  whether there is any clinical or laboratory evidence of Graves. 2. There is abnormal edema and enhancement in the left preseptal-periorbital fat. Furthermore there is diffuse injection with T2 hyperintensity and injection with enhancement in the postseptal-intraorbital fat of the left orbit most pronounced in the retrobulbar intraconal fat of the left orbit. There is also circumferential rind of thickening and increased enhancement in the outer covering of the left globe suggesting some scleral inflammation circumferentially along the margins of the left globe. There is asymmetric left eye proptosis due to the increased edema and enhancement in the intraorbital fat. Findings can be seen from idiopathic orbital inflammation or pseudotumor involving the scleral coverings of the globes and the intraorbital fat in the left orbit. Findings can also be seen with a pre and postseptal cellulitis from an infectious etiology that could be viral or bacterial in origin and the distinction cannot be made with certainty on the basis of this exam and must be correlated clinically and certainly if symptoms do not improve or certainly if they worsen a repeat orbital MRI with and without contrast could be obtained to reevaluate.  The results of this study were communicated to Esau Womack MD by telephone on 07/27/2022 at 2:20 PM.               Results Review:   I reviewed the patient's new clinical results.  Discussed with optho  Old records reviewed / Medical Decision Making High Complexity  I personally viewed and interpreted the patient's EKG/Telemetry data- sinus rhythm      ASSESSMENT AND PLAN    Orbital cellulitis on left    Down syndrome    Cognitive impairment    Elevated LFTs    ·  erythema/edema/scleral inflammation circumferentially/asymmetric left eye proptosis   -Differential diagnosis is  idiopathic orbital inflammation or pseudotumor or  pre and postseptal cellulitis 2/2 viral or bacterial   -Treating it like orbital  cellulitis with Vancomycin and Zosyn  -Can try ice and Toradol for anti-inflammatory and possibly consider steroids if antibiotics do not cause a significant change.  -Oculoplastics mention timolol drops therefore will restart  -Consult oculoplastics  -IV pain medication as needed    · Elevated TSH and a history of hypothyroidism  -Will increase her Synthroid from 75 to 88 mcg and patient can recheck in 6 weeks as an outpatient    ·   Elevated LFTs  -Unclear if new or old.  Patient has a lightly elevated ALT versus AST, will recheck in the morning possibly due to fatty liver disease but pt is on a statin therefore will monitor.          ·   Cognitive impairment/Down syndrome  -Sister says patient is not always reliable and answering questions about symptoms.    +DVT proph: scd  + Full code    I discussed the patients findings and my recommendations with the patient and/or family.  Please reference all orders placed.    Kimberly Carmona MD  07/27/22  21:12 EDT      This document is intended for medical expert use only. Reading of this document by patients and/or patient's family without participating in medical staff guidance may result in misinterpretation and unintended morbidity. Any interpretation of such data is the responsibility of the patient and/or family member responsible for the patient in concert with their primary or specialist providers, and NOT to be left for sources of online searches such as [employer], [employer] or similar queries. Relying on these approaches to knowledge may result in misinterpretation, misguided goals of care and even death should patients or family members try recommendations outside of the realm of professional medical care in a supervised way.    Dictated utilizing Dragon dictation:  Much of this encounter note is an electronic transcription/translation of spoken language to printed text. The electronic translation of spoken language may permit erroneous, or at times, nonsensical words  or phrases to be inadvertently transcribed; Although I have reviewed the note for such errors, some may still exist.    Electronically signed by Kimberly Carmona MD at 07/27/22 2129       Imaging Results (All)     Procedure Component Value Units Date/Time    US Liver [[id number]] Collected: 07/28/22 1948     Updated: 07/28/22 1956    Narrative:      US LIVER-     INDICATIONS: Elevated liver enzymes     TECHNIQUE: Ultrasound of the right upper quadrant     COMPARISON: None available     FINDINGS:     The gallbladder is nontender, with no stones demonstrated. No  gallbladder wall thickening or pericholecystic fluid.     No intrahepatic or extrahepatic biliary ductal dilatation is  demonstrated. The common biliary duct caliber is measured as much as 9  mm, mildly prominent, without a specific cause such as stone or lesion  identified.     No liver lesion is identified. Diffusely, the echogenicity of the liver  is otherwise in the range of normal relative to that of the right  kidney.     The pancreas is partly obscured. The main pancreatic duct is mildly  prominent, 4 mm. The right kidney, 9.0 cm, and the pancreas otherwise  appear unremarkable.                Impression:         No evidence for acute cholecystitis. Mild prominence of the caliber of  the common biliary duct and main pancreatic duct, without a specific  cause identified on this exam; if indicated, MRCP could be considered  for further evaluation.     This report was finalized on 7/28/2022 7:53 PM by Dr. Maldonado Goodwin M.D.                Physician Progress Notes (most recent note)      Esau Womack MD at 07/30/22 5136        Subjective:    Patient sitting up in chair, No distress. Sister at bedside. Patient pulled IV out last night. Reports her eye is continuing to feel better. Eager to go home.     Objective:  Afebrile    Bedside Eye exam:  VA: 20/60 OD, 20/100 OS with near card  Pupils: equal and reactive, no APD  EOM: full OD,  full OS, no pain  IOP: 17 OD, 25 OS  Mild tenderness to palpation of left eye    L/L: normal right eye, Left upper eyelid erythema and edema-resolving  Conjunctiva: normal right eye, bullous chemosis with  JANE 360 left eye-improving  Cornea: clear OU  AC: deep  Lens: clear    A/P:    1. Left Orbital Edema/Erythema  -DDx includes infectious vs inflammatory  -Started on broad spectrum IV abx  PM  -MRI with clear sinuses, no abscess for drainage  -IV steroids started on  PM with marked improvement  -Exam continues to improve, patient feeling better    -Okay for discharge per ophthalmology standpoint    -Switch to oral antibiotics per infectious disease  -Patient to get dose of oral steroids this AM since pulled out IV.   - Can discharge on 40mg of oral prednisone with slow taper  - Follow up to be scheduled by patient's sister at [place] office to see Dr. Womack in 1 month.   -Continue timolol OS for now      Neha Chery MD  [facility] Fellow    Electronically signed by Esau Womack MD at 22 5117          Discharge Summary      Symone John MD at 22 1230              Patient Name: Annette Vitale  : 1955  MRN: [id number]    Date of Admission: 2022  Date of Discharge:  2022  Primary Care Physician: Leonie Aguirre MD      Chief Complaint:   No chief complaint on file.      Discharge Diagnoses     Active Hospital Problems    Diagnosis  POA   • Orbital cellulitis on left [H05.012]  Yes   • Down syndrome [Q90.9]  Not Applicable   • Cognitive impairment [R41.89]  Yes   • Elevated LFTs [R79.89]  Yes   • Hypothyroid [E03.9]  Yes      Resolved Hospital Problems   No resolved problems to display.        Hospital Course     Patient is a 66 y.o. female presents with history of cerebral palsy who presents from [facility] due to increasing edema and redness on left eye.    Erythema/edema/scleral inflammation circumferentially/asymmetric left eye  proptosis   -Left eye orbital cellulitis versus vs idiopathic orbital inflammation or pseudotumor  Patient was started on broad-spectrum antibiotics vancomycin and ceftriaxone, followed by IV steroids.  Patient had marked improvement, left eye swelling and erythema significantly improved..  Ophthalmology and ID evaluated.  Discharged home on Bactrim and amoxicillin for 11 more days to complete 2-week course of antibiotics.  Also discharged home on steroid taper.  Patient to follow-up with outpatient ophthalmology, Dr. Womack in 1 month     Hypothyroidism: TSH was elevated to 7.5.  Patient home Synthroid increased from 75 to 88 mcg daily.  Free T4 low end of normal.  Free T3 low.  Will need repeat labs in 8 to 12 weeks to monitor response to increased dose of levothyroxine.         Elevated LFT/alkaline phosphatase.: Unclear if new or old.  Improving.  Patient is on statin, however since LFTs it is improving while on statin it was continued. Liver ultrasound no acute abnormalities.  Hepatitis panel negative.  Likely secondary to acute illness.      At the time of discharge patient was told to take all medications as prescribed, keep all follow-up appointments, and call their doctor or return to the hospital with any worsening or concerning symptoms.         Day of Discharge     Subjective:  Patient seen in the room.  Daughter present bedside.  Per daughter patient pulled out IV line patient was asking whether antibiotics and steroids can be switched to p.o.  Transitioned to oral prednisone, and oral antibiotics per ID.  Patient's eye swelling and redness continues to improve, significant improvement compared to yesterday.  Patient denies pain.  No fevers or chills.    Physical Exam:  Temp:  [96.9 °F (36.1 °C)-97.1 °F (36.2 °C)] 97.1 °F (36.2 °C)  Heart Rate:  [67-71] 71  Resp:  [16-18] 18  BP: (111-125)/(67-70) 125/70  Body mass index is 28.15 kg/m².  Physical Exam  General: Alert, no acute distress, sitting up in the  chair.  EYES:   left eye erythema and swelling marked improvement.   CV: Regular rate and rhythm, no murmurs rubs or gallops  Lungs: Clear to auscultation bilaterally, no crackles or wheezes  Abdomen: Soft, nontender, nondistended  Consultants     Consult Orders (all) (From admission, onward)     Start     Ordered    07/28/22 0616  Inpatient Infectious Diseases Consult  Once        Specialty:  Infectious Diseases  Provider:  (Not yet assigned)    07/28/22 0616    07/27/22 1809  Inpatient Ophthalmology Consult  Once        Specialty:  Ophthalmology  Provider:  Esau Womack MD    07/27/22 1808    07/27/22 1655  Inpatient Consult to Case Management   Once        Provider:  (Not yet assigned)    07/27/22 1658              Procedures     * Surgery not found *      Imaging Results (All)     Procedure Component Value Units Date/Time    US Liver [[id number]] Collected: 07/28/22 1948     Updated: 07/28/22 1956    Narrative:      US LIVER-     INDICATIONS: Elevated liver enzymes     TECHNIQUE: Ultrasound of the right upper quadrant     COMPARISON: None available     FINDINGS:     The gallbladder is nontender, with no stones demonstrated. No  gallbladder wall thickening or pericholecystic fluid.     No intrahepatic or extrahepatic biliary ductal dilatation is  demonstrated. The common biliary duct caliber is measured as much as 9  mm, mildly prominent, without a specific cause such as stone or lesion  identified.     No liver lesion is identified. Diffusely, the echogenicity of the liver  is otherwise in the range of normal relative to that of the right  kidney.     The pancreas is partly obscured. The main pancreatic duct is mildly  prominent, 4 mm. The right kidney, 9.0 cm, and the pancreas otherwise  appear unremarkable.                Impression:         No evidence for acute cholecystitis. Mild prominence of the caliber of  the common biliary duct and main pancreatic duct, without a specific  cause  identified on this exam; if indicated, MRCP could be considered  for further evaluation.     This report was finalized on 7/28/2022 7:53 PM by Dr. Maldnoado Goodwin M.D.               Pertinent Labs     Results from last 7 days   Lab Units 07/30/22  0810 07/29/22  0552 07/28/22  0611 07/27/22  1730   WBC 10*3/mm3 12.20* 8.07 6.14 7.22   HEMOGLOBIN g/dL 13.9 13.6 14.0 14.4   PLATELETS 10*3/mm3 365 326 301 368     Results from last 7 days   Lab Units 07/30/22  0810 07/29/22  0552 07/28/22  0611 07/27/22  1730   SODIUM mmol/L 144 142 141 141   POTASSIUM mmol/L 4.6 4.3 4.0 4.4   CHLORIDE mmol/L 105 108* 104 100   CO2 mmol/L 30.0* 23.8 26.9 31.0*   BUN mg/dL 13 11 11 12   CREATININE mg/dL 0.61 0.57 0.71 0.62   GLUCOSE mg/dL 64* 124* 109* 57*   Estimated Creatinine Clearance: 77.5 mL/min (by C-G formula based on SCr of 0.61 mg/dL).  Results from last 7 days   Lab Units 07/30/22  0810 07/29/22  0940 07/29/22  0552 07/28/22  0611   ALBUMIN g/dL 3.40* 3.00* 2.80* 2.90*   BILIRUBIN mg/dL 0.2 <0.2 <0.2 0.3   ALK PHOS U/L 341* 364* 360* 397*   AST (SGOT) U/L 25 29 36* 33*   ALT (SGPT) U/L 61* 66* 66* 74*     Results from last 7 days   Lab Units 07/30/22  0810 07/29/22  0940 07/29/22  0552 07/28/22  0611 07/27/22  1730   CALCIUM mg/dL 9.0  --  8.2* 8.2* 9.3   ALBUMIN g/dL 3.40* 3.00* 2.80* 2.90* 3.60   MAGNESIUM mg/dL 2.2  --  2.2  --  2.6*   PHOSPHORUS mg/dL 3.3  --  3.5  --   --        Results from last 7 days   Lab Units 07/27/22  1730   CK TOTAL U/L 62           Invalid input(s): LDLCALC      Results from last 7 days   Lab Units 07/27/22  1638   COVID19  Not Detected       Test Results Pending at Discharge       Discharge Details        Discharge Medications      New Medications      Instructions Start Date   acetaminophen 325 MG tablet  Commonly known as: TYLENOL   650 mg, Oral, Every 6 Hours PRN      amoxicillin 500 MG capsule  Commonly known as: AMOXIL   Take 1 capsule by mouth Every 8 (Eight) Hours for 33 doses.       predniSONE 20 MG tablet  Commonly known as: DELTASONE   Take 2 tablets by mouth Daily for 4 days, THEN 1 tablet Daily for 3 days, THEN 0.5 tablets Daily for 3 days.   Start Date: July 31, 2022     sulfamethoxazole-trimethoprim 800-160 MG per tablet  Commonly known as: BACTRIM DS,SEPTRA DS   Take 1 tablet by mouth Every 12 (Twelve) Hours for 22 doses.      timolol 0.25 % ophthalmic solution  Commonly known as: TIMOPTIC   1 drop, Left Eye, Every 12 Hours Scheduled         Changes to Medications      Instructions Start Date   levothyroxine 88 MCG tablet  Commonly known as: SYNTHROID, LEVOTHROID  What changed:   · medication strength  · how much to take   88 mcg, Oral, Every Early Morning         Continue These Medications      Instructions Start Date   alendronate 70 MG tablet  Commonly known as: FOSAMAX   70 mg, Oral, Every 7 Days, Patient takes every Monday morning      ascorbic acid 500 MG tablet  Commonly known as: VITAMIN C   500 mg, Oral, Daily, Takes at noon      pantoprazole 40 MG EC tablet  Commonly known as: PROTONIX   40 mg, Oral, Daily, Takes at noon      simvastatin 40 MG tablet  Commonly known as: ZOCOR   40 mg, Oral, Nightly             No Known Allergies    Discharge Disposition:  Home or Self Care      Discharge Diet:  No active diet order      Discharge Activity:       CODE STATUS:    Code Status and Medical Interventions:   Ordered at: 07/27/22 8978     Code Status (Patient has no pulse and is not breathing):    CPR (Attempt to Resuscitate)     Medical Interventions (Patient has pulse or is breathing):    Full       No future appointments.   Follow-up Information     Leonie Aguirre MD Follow up in 1 week(s).    Specialty: Internal Medicine  Contact information:  [facility] [address] [address] [phone]             Isidro Ponce MD Follow up.    Specialty: Infectious Diseases  Contact information:  [address] [address] [phone]              Symone John MD Follow up.    Specialties: Internal Medicine, Hospitalist  Contact information:  [address] [place] [address]  [phone]                         Time Spent on Discharge:  Greater than 30 minutes      Symone John MD  [facility]  07/30/22  13:16 EDT                Electronically signed by Symone John MD at 07/30/22 4511

## 2022-08-04 NOTE — PAYOR COMM NOTE
"3RD TIME FAXING CLINICAL TO THIS SAME FAX NUMBER [phone]  PLEASE PLEASE CHECK YOUR FAX MACHINE    Annette Vitale (66 y.o. Female)             Date of Birth   1955    Social Security Number       Address   [address] [place] [address]    Home Phone   [phone]    MRN   [id number]       Church   None    Marital Status   Single                            Admission Date   7/27/22    Admission Type   Urgent    Admitting Provider   Kimberly Carmona MD    Attending Provider       Department, Room/Bed   [address], 92/1       Discharge Date   7/30/2022    Discharge Disposition   Home or Self Care    Discharge Destination                               Attending Provider: (none)   Allergies: No Known Allergies    Isolation: None   Infection: None   Code Status: Prior   Advance Care Planning Activity    Ht: 147.3 cm (58\")   Wt: 61.1 kg (134 lb 11.2 oz)    Admission Cmt: None   Principal Problem: None                Active Insurance as of 7/27/2022     Primary Coverage     Payor Plan Insurance Group Employer/Plan Group    ANTHEM MEDICARE REPLACEMENT ANTHEM MEDICARE ADVANTAGE [id number]     Payor Plan Address Payor Plan Phone Number Payor Plan Fax Number Effective Dates    [address] [phone]  1/1/2022 - None Entered    [facility] [address]       Subscriber Name Subscriber Birth Date Member ID       ANNETTE VITALE 1955 [id number]                 Emergency Contacts      (Rel.) Home Phone Work Phone Mobile Phone    STACIE WINCHESTER (Sister) [phone] -- [phone]               History & Physical      Kimberly Carmona MD at 07/27/22 2112          PCP: Provider, No Known    Chief complaint left thigh swollen and red    HPI  Patient is a 66 y.o. female presents with history of cerebral palsy who presents from [facility] due to increasing edema and redness on left eye.  Patient sister is at bedside and can give " the history and said 3 days ago she started having some redness in her right eye, 2 days ago she had redness in both eyes and she tried some refresh drops, yesterday at 8 AM she noticed that the left eye was significantly swollen and at 8 AM she called primary care doctor which she got into which then got referred to the eye doctor which then got referred to an eye specialist and then an MRI was ordered and they went to [place] [facility] however sister reports that the radiology did not want to stay to get it done and rescheduled it for today at [facility].  Sister is frustrated since she got here and they did not have the order.  MRI was performed and results called to Dr. Womack and he requested patient be admitted.  Patient was directly admitted from radiology to start IV antibiotics.  Sister states that it was only 1/5 the swollen yesterday and has progressed quickly.  Is difficult to get a history of the patient and is sometimes unreliable.  She says she can see out of it okay but it is just a little watery.  There is observable difficulty with the mobility of the eye.  Patient denies any light sensitivity,  No fevers or chills, no constipation diarrhea or nausea vomiting      PAST MEDICAL HISTORY  Past Medical History:   Diagnosis Date   • Cognitive impairment 7/27/2022   • Down syndrome 7/27/2022       PAST SURGICAL HISTORY  No past surgical history on file.    FAMILY HISTORY  No family history on file.    SOCIAL HISTORY       MEDICATIONS:  Medications Prior to Admission   Medication Sig Dispense Refill Last Dose   • alendronate (FOSAMAX) 70 MG tablet Take 70 mg by mouth Every 7 (Seven) Days. Patient takes every Monday morning   7/25/2022   • ascorbic acid (VITAMIN C) 500 MG tablet Take 500 mg by mouth Daily. Takes at noon   7/27/2022 at Unknown time   • levothyroxine (SYNTHROID, LEVOTHROID) 75 MCG tablet Take 75 mcg by mouth Every Morning.   7/27/2022 at Unknown time   • pantoprazole (PROTONIX) 40 MG EC  "tablet Take 40 mg by mouth Daily. Takes at noon   7/27/2022 at Unknown time   • simvastatin (ZOCOR) 40 MG tablet Take 40 mg by mouth Every Night.   7/26/2022 at Unknown time       Allergies:  Patient has no known allergies.    Review of Systems:  Unable to obtain due to cognitive delay        Vital Signs  Temp:  [97 °F (36.1 °C)] 97 °F (36.1 °C)  Heart Rate:  [60] 60  Resp:  [16] 16  BP: (116)/(73) 116/73  Flowsheet Rows    Flowsheet Row First Filed Value   Admission Height 147.3 cm (58\") Documented at 07/27/2022 1630   Admission Weight 61.1 kg (134 lb 11.2 oz) Documented at 07/27/2022 1630         Body mass index is 28.15 kg/m².    Physical Exam:  General Appearance:    Alert, cooperative, in no acute distress; Down syndrome   Head:    Normocephalic, without obvious abnormality, atraumatic   Eyes:         conjunctivae edema and sclerae erythematous mildly on right and severely on left, no icterus, PERRLA, decrease EOM, exophthalmos on left, erythema periorbitally on left, edematous left lid   ENT:    Ears grossly intact, oral mucosa moist, poor dentition   Neck:   No adenopathy, supple, trachea midline,    Back:     Normal to inspection, range of motion normal   Lungs:     Clear to auscultation,respirations regular, even and                   unlabored    Heart:    Regular rhythm and normal rate,  no murmur, normal S1 and S2,   Abdomen:     Normal bowel sounds, no masses,  soft non-tender, non-distended,    Extremities:   Moves all extremities well, no cyanosis, no edema,   + clubbing digits            Pulses:   Pulses palpable and equal bilaterally   Skin:   No bleeding, rash, bruising    Neurologic:    Psych:   Cranial nerves 2 - 12 grossly intact, sensation grossly intact,     Moves all extremities well, equal bilateral strength    Alert and Oriented x 2, Normal Affect     I used full protective equipment while examining this patient.  This includes N95 face mask /protective eyewear and protective gown when " appropriate.  I washed/sanitized my hands before entering the room and immediately upon leaving the room.    LABS:  Admission on 07/27/2022   Component Date Value Ref Range Status   • COVID19 07/27/2022 Not Detected  Not Detected - Ref. Range Final   • WBC 07/27/2022 7.22  3.40 - 10.80 10*3/mm3 Final   • RBC 07/27/2022 4.46  3.77 - 5.28 10*6/mm3 Final   • Hemoglobin 07/27/2022 14.4  12.0 - 15.9 g/dL Final   • Hematocrit 07/27/2022 42.0  34.0 - 46.6 % Final   • MCV 07/27/2022 94.2  79.0 - 97.0 fL Final   • MCH 07/27/2022 32.3  26.6 - 33.0 pg Final   • MCHC 07/27/2022 34.3  31.5 - 35.7 g/dL Final   • RDW 07/27/2022 13.8  12.3 - 15.4 % Final   • RDW-SD 07/27/2022 47.2  37.0 - 54.0 fl Final   • MPV 07/27/2022 9.6  6.0 - 12.0 fL Final   • Platelets 07/27/2022 368  140 - 450 10*3/mm3 Final   • Neutrophil % 07/27/2022 64.3  42.7 - 76.0 % Final   • Lymphocyte % 07/27/2022 20.8  19.6 - 45.3 % Final   • Monocyte % 07/27/2022 10.9  5.0 - 12.0 % Final   • Eosinophil % 07/27/2022 1.4  0.3 - 6.2 % Final   • Basophil % 07/27/2022 0.8  0.0 - 1.5 % Final   • Immature Grans % 07/27/2022 1.8 (A) 0.0 - 0.5 % Final   • Neutrophils, Absolute 07/27/2022 4.64  1.70 - 7.00 10*3/mm3 Final   • Lymphocytes, Absolute 07/27/2022 1.50  0.70 - 3.10 10*3/mm3 Final   • Monocytes, Absolute 07/27/2022 0.79  0.10 - 0.90 10*3/mm3 Final   • Eosinophils, Absolute 07/27/2022 0.10  0.00 - 0.40 10*3/mm3 Final   • Basophils, Absolute 07/27/2022 0.06  0.00 - 0.20 10*3/mm3 Final   • Immature Grans, Absolute 07/27/2022 0.13 (A) 0.00 - 0.05 10*3/mm3 Final   • nRBC 07/27/2022 0.0  0.0 - 0.2 /100 WBC Final   • PTT 07/27/2022 28.2  22.7 - 35.4 seconds Final   • Creatine Kinase 07/27/2022 62  20 - 180 U/L Final   • Glucose 07/27/2022 57 (A) 65 - 99 mg/dL Final   • BUN 07/27/2022 12  8 - 23 mg/dL Final   • Creatinine 07/27/2022 0.62  0.57 - 1.00 mg/dL Final   • Sodium 07/27/2022 141  136 - 145 mmol/L Final   • Potassium 07/27/2022 4.4  3.5 - 5.2 mmol/L Final   •  Chloride 07/27/2022 100  98 - 107 mmol/L Final   • CO2 07/27/2022 31.0 (A) 22.0 - 29.0 mmol/L Final   • Calcium 07/27/2022 9.3  8.6 - 10.5 mg/dL Final   • Total Protein 07/27/2022 6.9  6.0 - 8.5 g/dL Final   • Albumin 07/27/2022 3.60  3.50 - 5.20 g/dL Final   • ALT (SGPT) 07/27/2022 96 (A) 1 - 33 U/L Final   • AST (SGOT) 07/27/2022 48 (A) 1 - 32 U/L Final   • Alkaline Phosphatase 07/27/2022 479 (A) 39 - 117 U/L Final   • Total Bilirubin 07/27/2022 0.2  0.0 - 1.2 mg/dL Final   • Globulin 07/27/2022 3.3  gm/dL Final   • A/G Ratio 07/27/2022 1.1  g/dL Final   • BUN/Creatinine Ratio 07/27/2022 19.4  7.0 - 25.0 Final   • Anion Gap 07/27/2022 10.0  5.0 - 15.0 mmol/L Final   • eGFR 07/27/2022 99.6  >60.0 mL/min/1.73 Final    National Kidney Foundation and American Society of Nephrology (ASN) Task Force recommended calculation based on the Chronic Kidney Disease Epidemiology Collaboration (CKD-EPI) equation refit without adjustment for race.   • Hemoglobin A1C 07/27/2022 5.70 (A) 4.80 - 5.60 % Final   • Lactate 07/27/2022 0.7  0.5 - 2.0 mmol/L Final   • Magnesium 07/27/2022 2.6 (A) 1.6 - 2.4 mg/dL Final   • Procalcitonin 07/27/2022 0.05  0.00 - 0.25 ng/mL Final   • Protime 07/27/2022 13.4  11.7 - 14.2 Seconds Final   • INR 07/27/2022 1.03  0.90 - 1.10 Final   • TSH 07/27/2022 7.500 (A) 0.270 - 4.200 uIU/mL Final   • C-Reactive Protein 07/27/2022 7.64 (A) 0.00 - 0.50 mg/dL Final   • Sed Rate 07/27/2022 90 (A) 0 - 30 mm/hr Final   Hospital Outpatient Visit on 07/27/2022   Component Date Value Ref Range Status   • Glucose 07/27/2022 84  70 - 130 mg/dL Final    Meter: [id number] : [id number] Thom BA       DIAGNOSTICS:  MRI Orbit Face Neck With & Without Contrast    Result Date: 7/27/2022  1. The extraocular muscles including the superior, medial, inferior and lateral rectus muscles appear somewhat thickened bilaterally, may be upper limits of normal, could be mildly thickened. Please correlate clinically as to  whether there is any clinical or laboratory evidence of Graves. 2. There is abnormal edema and enhancement in the left preseptal-periorbital fat. Furthermore there is diffuse injection with T2 hyperintensity and injection with enhancement in the postseptal-intraorbital fat of the left orbit most pronounced in the retrobulbar intraconal fat of the left orbit. There is also circumferential rind of thickening and increased enhancement in the outer covering of the left globe suggesting some scleral inflammation circumferentially along the margins of the left globe. There is asymmetric left eye proptosis due to the increased edema and enhancement in the intraorbital fat. Findings can be seen from idiopathic orbital inflammation or pseudotumor involving the scleral coverings of the globes and the intraorbital fat in the left orbit. Findings can also be seen with a pre and postseptal cellulitis from an infectious etiology that could be viral or bacterial in origin and the distinction cannot be made with certainty on the basis of this exam and must be correlated clinically and certainly if symptoms do not improve or certainly if they worsen a repeat orbital MRI with and without contrast could be obtained to reevaluate.  The results of this study were communicated to Esau Womack MD by telephone on 07/27/2022 at 2:20 PM.               Results Review:   I reviewed the patient's new clinical results.  Discussed with optho  Old records reviewed / Medical Decision Making High Complexity  I personally viewed and interpreted the patient's EKG/Telemetry data- sinus rhythm      ASSESSMENT AND PLAN    Orbital cellulitis on left    Down syndrome    Cognitive impairment    Elevated LFTs    ·  erythema/edema/scleral inflammation circumferentially/asymmetric left eye proptosis   -Differential diagnosis is  idiopathic orbital inflammation or pseudotumor or  pre and postseptal cellulitis 2/2 viral or bacterial   -Treating it like orbital  cellulitis with Vancomycin and Zosyn  -Can try ice and Toradol for anti-inflammatory and possibly consider steroids if antibiotics do not cause a significant change.  -Oculoplastics mention timolol drops therefore will restart  -Consult oculoplastics  -IV pain medication as needed    · Elevated TSH and a history of hypothyroidism  -Will increase her Synthroid from 75 to 88 mcg and patient can recheck in 6 weeks as an outpatient    ·   Elevated LFTs  -Unclear if new or old.  Patient has a lightly elevated ALT versus AST, will recheck in the morning possibly due to fatty liver disease but pt is on a statin therefore will monitor.          ·   Cognitive impairment/Down syndrome  -Sister says patient is not always reliable and answering questions about symptoms.    +DVT proph: scd  + Full code    I discussed the patients findings and my recommendations with the patient and/or family.  Please reference all orders placed.    Kimberly Carmona MD  07/27/22  21:12 EDT      This document is intended for medical expert use only. Reading of this document by patients and/or patient's family without participating in medical staff guidance may result in misinterpretation and unintended morbidity. Any interpretation of such data is the responsibility of the patient and/or family member responsible for the patient in concert with their primary or specialist providers, and NOT to be left for sources of online searches such as [employer], [employer] or similar queries. Relying on these approaches to knowledge may result in misinterpretation, misguided goals of care and even death should patients or family members try recommendations outside of the realm of professional medical care in a supervised way.    Dictated utilizing Dragon dictation:  Much of this encounter note is an electronic transcription/translation of spoken language to printed text. The electronic translation of spoken language may permit erroneous, or at times, nonsensical words  or phrases to be inadvertently transcribed; Although I have reviewed the note for such errors, some may still exist.    Electronically signed by Kimberly Carmona MD at 07/27/22 2129         Imaging Results (Last 7 Days)     Procedure Component Value Units Date/Time    US Liver [[id number]] Collected: 07/28/22 1948     Updated: 07/28/22 1956    Narrative:      US LIVER-     INDICATIONS: Elevated liver enzymes     TECHNIQUE: Ultrasound of the right upper quadrant     COMPARISON: None available     FINDINGS:     The gallbladder is nontender, with no stones demonstrated. No  gallbladder wall thickening or pericholecystic fluid.     No intrahepatic or extrahepatic biliary ductal dilatation is  demonstrated. The common biliary duct caliber is measured as much as 9  mm, mildly prominent, without a specific cause such as stone or lesion  identified.     No liver lesion is identified. Diffusely, the echogenicity of the liver  is otherwise in the range of normal relative to that of the right  kidney.     The pancreas is partly obscured. The main pancreatic duct is mildly  prominent, 4 mm. The right kidney, 9.0 cm, and the pancreas otherwise  appear unremarkable.                Impression:         No evidence for acute cholecystitis. Mild prominence of the caliber of  the common biliary duct and main pancreatic duct, without a specific  cause identified on this exam; if indicated, MRCP could be considered  for further evaluation.     This report was finalized on 7/28/2022 7:53 PM by Dr. Maldonado Goodwin M.D.              Physician Progress Notes (last 7 days)      Esau Womack MD at 07/30/22 1295        Subjective:    Patient sitting up in chair, No distress. Sister at bedside. Patient pulled IV out last night. Reports her eye is continuing to feel better. Eager to go home.     Objective:  Afebrile    Bedside Eye exam:  VA: 20/60 OD, 20/100 OS with near card  Pupils: equal and reactive, no APD  EOM: full OD,  full OS, no pain  IOP: 17 OD, 25 OS  Mild tenderness to palpation of left eye    L/L: normal right eye, Left upper eyelid erythema and edema-resolving  Conjunctiva: normal right eye, bullous chemosis with  JANE 360 left eye-improving  Cornea: clear OU  AC: deep  Lens: clear    A/P:    1. Left Orbital Edema/Erythema  -DDx includes infectious vs inflammatory  -Started on broad spectrum IV abx 7/27 PM  -MRI with clear sinuses, no abscess for drainage  -IV steroids started on 7/28 PM with marked improvement  -Exam continues to improve, patient feeling better    -Okay for discharge per ophthalmology standpoint    -Switch to oral antibiotics per infectious disease  -Patient to get dose of oral steroids this AM since pulled out IV.   - Can discharge on 40mg of oral prednisone with slow taper  - Follow up to be scheduled by patient's sister at [place] office to see Dr. Womack in 1 month.   -Continue timolol OS for now      Neha Chery MD  OPRS Fellow    Electronically signed by Esau Womack MD at 07/30/22 0969     Isidro Ponce MD at 07/30/22 0920           LOS: 3 days     Chief Complaint:  Follow-up L eye infection vs inflammation    Interval History:  L eye continues to improve. Eager for DC. No fever. Pulled out her IV. D/W her sister and Dr John.    ROS: no CP or SOA    Vital Signs  Temp:  [96.9 °F (36.1 °C)-97.7 °F (36.5 °C)] 97.1 °F (36.2 °C)  Heart Rate:  [67-75] 71  Resp:  [16-18] 18  BP: (111-139)/(67-78) 125/70    Physical Exam:  General: awake, alert, very nice  Eyes: L eye swelling and erythema nearly resolved  ENT: MMM  Cardiovascular: NR  Respiratory: normal work of breathing on ambient air  GI: Abdomen is soft  :  no Parra catheter  Musculoskeletal: normal musculature  Skin: No rashes  Neurological: Alert and oriented x 3  Psychiatric: Normal mood and affect     Antibiotics:  •  cefTRIAXone (ROCEPHIN) 2 g in sodium chloride 0.9 % 100 mL IVPB-VTB, 2 g, Intravenous, Q24H  •   vancomycin IV    LABS:   VTr reviewed today  Lab Results   Component Value Date    WBC 8.07 07/29/2022    HGB 13.6 07/29/2022    HCT 39.6 07/29/2022    MCV 96.1 07/29/2022     07/29/2022     Lab Results   Component Value Date    GLUCOSE 124 (H) 07/29/2022    CALCIUM 8.2 (L) 07/29/2022     07/29/2022    K 4.3 07/29/2022    CO2 23.8 07/29/2022     (H) 07/29/2022    BUN 11 07/29/2022    CREATININE 0.57 07/29/2022    BCR 19.3 07/29/2022    ANIONGAP 10.2 07/29/2022     Lab Results   Component Value Date    VANCOTROUGH 10.60 07/29/2022     Lab Results   Component Value Date    CRP 7.64 (H) 07/27/2022     Microbiology:  7/27 COVID: negative     Radiology (prior)  7/27 MRI Orbit/Neck/Face:  1. The extraocular muscles including the superior, medial, inferior and lateral rectus muscles appear somewhat thickened bilaterally, may be upper limits of normal, could be mildly thickened. Please correlate clinically as to whether there is any clinical or laboratory evidence of Graves.   2. There is abnormal edema and enhancement in the left preseptal-periorbital fat. Furthermore there is diffuse injection with T2 hyperintensity and injection with enhancement in the postseptal-intraorbital fat of the left orbit most pronounced in the retrobulbar intraconal fat of the left orbit. There is also   circumferential rind of thickening and increased enhancement in the outer covering of the left globe suggesting some scleral inflammation circumferentially along the margins of the left globe. There is   asymmetric left eye proptosis due to the increased edema and enhancement in the intraorbital fat. Findings can be seen from idiopathic orbital inflammation or pseudotumor involving the scleral coverings of the globes and the intraorbital fat in the left orbit. Findings can also be seen with a pre and postseptal cellulitis from an infectious etiology that could be viral or bacterial in origin and the distinction cannot be made  with certainty on the basis of this exam and must be correlated clinically and certainly if symptoms do not improve or certainly if they worsen a repeat orbital MRI with and without contrast could be obtained to reevaluate.      ASSESSMENT/PLAN:  1. Left eye orbital cellulitis vs inflammatory etiology  2. Hypothyroidism  3. Down syndrome     Marked improvement w/ vancomycin, ceftriaxone and steroids. Eager for DC. Change to Bactrim DS BID and amoxicillin 500 mg PO q8h x 11 more days to complete a 2-week total course. She will have follow-up with ophthalmologist. They are also going to give her a steroid taper. Thanks to them for their expertise.     Thank you for allowing me to be involved in the care of this patient. Infectious diseases will sign off at this time with antibiotics plan in place, but please call me at [phone] if any further ID questions or new ID concerns.        Electronically signed by Isidro Ponce MD at 22 0981     Symone John MD at 22 1431              Name: Annette Vitale ADMIT: 2022   : 1955  PCP: Leonie Aguirre MD    MRN: [id number] LOS: 2 days   AGE/SEX: 66 y.o. female  ROOM: [facility]     Subjective   Subjective   60-minute up in the chair, eating breakfast.  Daughter present in the room.  Improving.  Denies any pain.  Swelling and redness significantly improved.  Was initiated on IV steroids yesterday.     Review of Systems  As above  Objective   Objective   Vital Signs  Temp:  [97 °F (36.1 °C)-99.8 °F (37.7 °C)] 97.7 °F (36.5 °C)  Heart Rate:  [70-74] 74  Resp:  [16] 16  BP: (108-139)/(61-81) 139/70  SpO2:  [97 %-99 %] 97 %  on   ;   Device (Oxygen Therapy): room air  Body mass index is 28.15 kg/m².  Physical Exam    General: Alert and oriented x3, no acute distress  EYES: Left eye erythema and swelling significantly improved compared to admission.  No pain with extraocular movements.  Left eye conjunctivitis.  CV: Regular rate  and rhythm, no murmurs rubs or gallops  Lungs: Clear to auscultation bilaterally, no crackles or wheezes  Abdomen: Soft, nontender, nondistended  Extremities: No significant peripheral edema , no cyanosis     Results Review     I reviewed the patient's new clinical results.  Results from last 7 days   Lab Units 07/29/22  0552 07/28/22  0611 07/27/22  1730   WBC 10*3/mm3 8.07 6.14 7.22   HEMOGLOBIN g/dL 13.6 14.0 14.4   PLATELETS 10*3/mm3 326 301 368     Results from last 7 days   Lab Units 07/29/22  0552 07/28/22  0611 07/27/22  1730 07/27/22  1214   SODIUM mmol/L 142 141 141  --    POTASSIUM mmol/L 4.3 4.0 4.4  --    CHLORIDE mmol/L 108* 104 100  --    CO2 mmol/L 23.8 26.9 31.0*  --    BUN mg/dL 11 11 12  --    CREATININE mg/dL 0.57 0.71 0.62 0.80   GLUCOSE mg/dL 124* 109* 57*  --    Estimated Creatinine Clearance: 83 mL/min (by C-G formula based on SCr of 0.57 mg/dL).  Results from last 7 days   Lab Units 07/29/22  0940 07/29/22  0552 07/28/22  0611 07/27/22  1730   ALBUMIN g/dL 3.00* 2.80* 2.90* 3.60   BILIRUBIN mg/dL <0.2 <0.2 0.3 0.2   ALK PHOS U/L 364* 360* 397* 479*   AST (SGOT) U/L 29 36* 33* 48*   ALT (SGPT) U/L 66* 66* 74* 96*     Results from last 7 days   Lab Units 07/29/22  0940 07/29/22  0552 07/28/22  0611 07/27/22  1730   CALCIUM mg/dL  --  8.2* 8.2* 9.3   ALBUMIN g/dL 3.00* 2.80* 2.90* 3.60   MAGNESIUM mg/dL  --  2.2  --  2.6*   PHOSPHORUS mg/dL  --  3.5  --   --      Results from last 7 days   Lab Units 07/27/22  1730   PROCALCITONIN ng/mL 0.05   LACTATE mmol/L 0.7     COVID19   Date Value Ref Range Status   07/27/2022 Not Detected Not Detected - Ref. Range Final     Hemoglobin A1C   Date/Time Value Ref Range Status   07/27/2022 1730 5.70 (H) 4.80 - 5.60 % Final     Glucose   Date/Time Value Ref Range Status   07/27/2022 1915 84 70 - 130 mg/dL Final     Comment:     Meter: [id number] : [id number] Thom BA       MRI Orbit Face Neck With & Without Contrast  Narrative: MRI OF THE ORBITS  WITH AND WITHOUT CONTRAST 07/27/2022     CLINICAL HISTORY: The patient has had bilateral redness in the eyes for  4 days, left eye is swollen for 1 day, cellulitis of left orbit.     TECHNIQUE: Axial T1 and diffusion coronal fat-suppressed T2 and pre and  postcontrast axial and coronal fat-suppressed T1-weighted images were  obtained through the orbits.     COMPARISON: There are no prior orbital MRIs for comparison.     FINDINGS: There is abnormal T2 hyperintensity and increased enhancement  in the left periorbital-preseptal fat that extends into the lateral  periorbital fat and anterior inferiorly over the anterior aspect of the  left maxillary sinus. Furthermore there is abnormal diffuse induration  injection of the postseptal, intraorbital fat of the left orbit with  abnormal T2 hyperintense stranding in the intraorbital fat and  enhancement and injection of the intraorbital fat of the left orbit most  pronounced in the retrobulbar intraconal fat of the left orbit.  Furthermore there is a circumferential rind of thickening and increased  enhancement in the outer covering of the left globe suggesting scleral  thickening and enhancement. There is asymmetric mild left-sided  proptosis. The extraocular muscles including the superior, medial,  inferior and lateral rectus muscles appear somewhat thickened  bilaterally. The optic nerves demonstrate normal signal. The cavernous  sinuses are symmetric and normal in appearance. The visualized  intracranial structures are normal in appearance. Paranasal sinuses are  clear.      Impression: 1. The extraocular muscles including the superior, medial, inferior and  lateral rectus muscles appear somewhat thickened bilaterally, may be  upper limits of normal, could be mildly thickened. Please correlate  clinically as to whether there is any clinical or laboratory evidence of  Graves.  2. There is abnormal edema and enhancement in the left  preseptal-periorbital fat. Furthermore,  there is diffuse injection with  T2 hyperintensity and injection with enhancement in the  postseptal-intraorbital fat of the left orbit most pronounced in the  retrobulbar intraconal fat of the left orbit. There is also  circumferential rind of thickening and increased enhancement in and  along the outer covering of the left globe suggesting some scleral  inflammation circumferentially along the margins of the left globe.  There is asymmetric left eye proptosis due to the increased edema and  enhancement in the intraorbital fat. Findings can be seen from  idiopathic orbital inflammation or pseudotumor involving the scleral  coverings of the left lobe and the intraorbital fat in the left orbit.  Findings can also be seen with a pre and postseptal cellulitis from an  infectious etiology that could be viral or bacterial in origin and the  distinction cannot be made with certainty on the basis of this exam and  must be correlated clinically and certainly if symptoms do not improve  or certainly if they worsen a repeat orbital MRI with and without  contrast could be obtained to reevaluate.      The results of this study were communicated to Esau Womack MD by  telephone on 07/27/2022 at 2:20 PM.     This report was finalized on 7/29/2022 6:34 AM by Dr. Armin Workman M.D.       Scheduled Medications  atorvastatin, 20 mg, Oral, Nightly  cefTRIAXone, 2 g, Intravenous, Q24H  erythromycin, , Left Eye, Q12H  levothyroxine, 88 mcg, Oral, Q AM  methylPREDNISolone sodium succinate, 60 mg, Intravenous, Daily  pantoprazole, 40 mg, Oral, Q AM  senna-docusate sodium, 2 tablet, Oral, BID  sodium chloride, 10 mL, Intravenous, Q12H  timolol, 1 drop, Left Eye, Q12H  vancomycin, 1,000 mg, Intravenous, Q12H    Infusions  Pharmacy to dose vancomycin,   sodium chloride, 125 mL/hr, Last Rate: 125 mL/hr (07/29/22 0209)    Diet  Diet Regular      Assessment/Plan     Active Hospital Problems    Diagnosis  POA   • Orbital cellulitis on left  [H05.012]  Yes   • Down syndrome [Q90.9]  Not Applicable   • Cognitive impairment [R41.89]  Yes   • Elevated LFTs [R79.89]  Yes   • Hypothyroid [E03.9]  Yes      Resolved Hospital Problems   No resolved problems to display.         Erythema/edema/scleral inflammation circumferentially/asymmetric left eye proptosis   -Differential diagnosis is  idiopathic orbital inflammation or pseudotumor or  pre and postseptal cellulitis 2/2 viral or bacterial   -ID following, continue vancomycin and ceftriaxone.  -Ophthalmology evaluated.  Initiated on steroidd 07/29  -Continue vancomycin, ceftriaxone, steroids.       Hypothyroidism: TSH was elevated to 7.5.  Patient home Synthroid increased from 75 to 88 mcg daily.  Free T4 low end of normal.  Free T3 low.  Will need repeat labs in 8 to 12 weeks to monitor response to increased dose of levothyroxine.       Elevated LFT/alkaline phosphatase.: Unclear if new or old.  Improving.  Patient is on statin, however since LFTs it is improving we will continue statin and monitor CMP daily.  Liver ultrasound no acute abnormalities.          Cognitive impairment/Down syndrome  -Per sister patient is not always reliable and answering questions about symptoms.        DVT prophylaxis: SCDs  Disposition: Home, likely tomorrow pending final recs from ID and ophthalmology.      I wore protective equipment throughout this patient encounter including a face mask, gloves and protective eyewear.  Hand hygiene was performed before donning protective equipment and after removal when leaving the room.      Dictated utilizing Dragon dictation        Symone John MD  [facility]  07/29/22  11:47 EDT        Electronically signed by Symone John MD at 07/29/22 1150     Isidro Ponce MD at 07/29/22 1123           LOS: 2 days     Chief Complaint:  Follow-up L eye infection vs inflammation    Interval History:  Marked improvement today in pain and swelling and  erythema. Tolerating antibiotics and steroids w/o side effects. History from pt and her sister.     ROS: no CP or SOA    Vital Signs  Temp:  [97 °F (36.1 °C)-99.8 °F (37.7 °C)] 97.7 °F (36.5 °C)  Heart Rate:  [70-74] 74  Resp:  [16] 16  BP: (108-139)/(61-81) 139/70    Physical Exam:  General: awake, alert, very nice  Eyes: L eye swelling and erythema much better  ENT: MMM  Cardiovascular: NR  Respiratory: normal work of breathing on RA  GI: Abdomen is soft  :  no Parra catheter  Musculoskeletal: normal musculature  Skin: No rashes  Neurological: Alert and oriented x 3  Psychiatric: Normal mood and affect   Vasc: PIV w/o erythema    Antibiotics:  •  cefTRIAXone (ROCEPHIN) 2 g in sodium chloride 0.9 % 100 mL IVPB-VTB, 2 g, Intravenous, Q24H  •  vancomycin 750 mg/250 mL 0.9% NS IVPB ([facility]), 750 mg, Intravenous, Q12H    LABS:  CBC, BMP, micro reviewed today  Lab Results   Component Value Date    WBC 8.07 07/29/2022    HGB 13.6 07/29/2022    HCT 39.6 07/29/2022    MCV 96.1 07/29/2022     07/29/2022     Lab Results   Component Value Date    GLUCOSE 124 (H) 07/29/2022    CALCIUM 8.2 (L) 07/29/2022     07/29/2022    K 4.3 07/29/2022    CO2 23.8 07/29/2022     (H) 07/29/2022    BUN 11 07/29/2022    CREATININE 0.57 07/29/2022    BCR 19.3 07/29/2022    ANIONGAP 10.2 07/29/2022     No results found for: ROSALINE EDGAR VANCORANDOM    Lab Results   Component Value Date    CRP 7.64 (H) 07/27/2022     Microbiology:  7/27 COVID: negative     Radiology (prior)  7/27 MRI Orbit/Neck/Face:  1. The extraocular muscles including the superior, medial, inferior and lateral rectus muscles appear somewhat thickened bilaterally, may be upper limits of normal, could be mildly thickened. Please correlate clinically as to whether there is any clinical or laboratory evidence of Graves.   2. There is abnormal edema and enhancement in the left preseptal-periorbital fat. Furthermore there is diffuse injection with T2  hyperintensity and injection with enhancement in the postseptal-intraorbital fat of the left orbit most pronounced in the retrobulbar intraconal fat of the left orbit. There is also   circumferential rind of thickening and increased enhancement in the outer covering of the left globe suggesting some scleral inflammation circumferentially along the margins of the left globe. There is   asymmetric left eye proptosis due to the increased edema and enhancement in the intraorbital fat. Findings can be seen from idiopathic orbital inflammation or pseudotumor involving the scleral coverings of the globes and the intraorbital fat in the left orbit. Findings can also be seen with a pre and postseptal cellulitis from an infectious etiology that could be viral or bacterial in origin and the distinction cannot be made with certainty on the basis of this exam and must be correlated clinically and certainly if symptoms do not improve or certainly if they worsen a repeat orbital MRI with and without contrast could be obtained to reevaluate.      ASSESSMENT/PLAN:  1. Left eye orbital cellulitis vs inflammatory etiology  2. Hypothyroidism  3. Down syndrome     Marked improvement w/ vancomycin, ceftriaxone and steroids. If doing well tomorrow, will make final antibiotic recommendations.      ID will follow.         Electronically signed by Isidro Ponce MD at 07/29/22 1126     Esau Womack MD at 07/29/22 1975        Subjective:    Patient laying in bed comfortably. No distress. Sister at bedside. Reports her eye is feeling better.     Objective:  Afebrile    Bedside Eye exam:  VA: 20/60 OD, 20/100 OS with near card  Pupils: equal and reactive, no APD  EOM: full OD, full OS, no pain  Mild tenderness to palpation of left eye    L/L: normal right eye, Left upper eyelid erythema and edema-resolving  Conjunctiva: normal right eye, bullous chemosis with  JANE 360 left eye-improving  Cornea: clear OU  AC: deep  Lens:  clear    A/P:    1. Left Orbital Edema/Erythema  -DDx includes infectious vs inflammatory  -Started on broad spectrum IV abx 7/27 PM  -MRI with clear sinuses, no abscess for drainage  -Marked improvement with IV steroids  -Started 1mg/kg IV steroids last night  -Exam continues to improve, patient feeling better    -Could consider switching to oral antibiotics, will leave up to primary team  -Continue IV steroids for now  -Continue timolol OS for now  -Will check on patient again tomorrow      Neha Chery MD  [facility] Fellow    Electronically signed by Esau Womack MD at 07/29/22 1081